# Patient Record
Sex: MALE | HISPANIC OR LATINO | Employment: UNEMPLOYED | URBAN - METROPOLITAN AREA
[De-identification: names, ages, dates, MRNs, and addresses within clinical notes are randomized per-mention and may not be internally consistent; named-entity substitution may affect disease eponyms.]

---

## 2018-12-30 ENCOUNTER — HOSPITAL ENCOUNTER (EMERGENCY)
Facility: MEDICAL CENTER | Age: 38
End: 2018-12-30
Attending: EMERGENCY MEDICINE

## 2018-12-30 ENCOUNTER — APPOINTMENT (OUTPATIENT)
Dept: RADIOLOGY | Facility: MEDICAL CENTER | Age: 38
End: 2018-12-30
Attending: EMERGENCY MEDICINE

## 2018-12-30 VITALS
HEIGHT: 67 IN | SYSTOLIC BLOOD PRESSURE: 141 MMHG | HEART RATE: 82 BPM | RESPIRATION RATE: 15 BRPM | TEMPERATURE: 98.4 F | WEIGHT: 199.96 LBS | DIASTOLIC BLOOD PRESSURE: 96 MMHG | BODY MASS INDEX: 31.38 KG/M2 | OXYGEN SATURATION: 95 %

## 2018-12-30 DIAGNOSIS — R10.9 FLANK PAIN: ICD-10-CM

## 2018-12-30 DIAGNOSIS — R73.9 HYPERGLYCEMIA: ICD-10-CM

## 2018-12-30 DIAGNOSIS — E87.6 HYPOKALEMIA: ICD-10-CM

## 2018-12-30 LAB
ALBUMIN SERPL BCP-MCNC: 4.1 G/DL (ref 3.2–4.9)
ALBUMIN/GLOB SERPL: 1.4 G/DL
ALP SERPL-CCNC: 84 U/L (ref 30–99)
ALT SERPL-CCNC: 23 U/L (ref 2–50)
ANION GAP SERPL CALC-SCNC: 8 MMOL/L (ref 0–11.9)
APPEARANCE UR: CLEAR
AST SERPL-CCNC: 20 U/L (ref 12–45)
BACTERIA #/AREA URNS HPF: ABNORMAL /HPF
BASOPHILS # BLD AUTO: 0.9 % (ref 0–1.8)
BASOPHILS # BLD: 0.05 K/UL (ref 0–0.12)
BILIRUB SERPL-MCNC: 0.9 MG/DL (ref 0.1–1.5)
BILIRUB UR QL STRIP.AUTO: NEGATIVE
BUN SERPL-MCNC: 14 MG/DL (ref 8–22)
CALCIUM SERPL-MCNC: 9.3 MG/DL (ref 8.4–10.2)
CHLORIDE SERPL-SCNC: 101 MMOL/L (ref 96–112)
CO2 SERPL-SCNC: 27 MMOL/L (ref 20–33)
COLOR UR: YELLOW
CREAT SERPL-MCNC: 0.61 MG/DL (ref 0.5–1.4)
EOSINOPHIL # BLD AUTO: 0.08 K/UL (ref 0–0.51)
EOSINOPHIL NFR BLD: 1.5 % (ref 0–6.9)
EPI CELLS #/AREA URNS HPF: NEGATIVE /HPF
ERYTHROCYTE [DISTWIDTH] IN BLOOD BY AUTOMATED COUNT: 35.9 FL (ref 35.9–50)
GLOBULIN SER CALC-MCNC: 2.9 G/DL (ref 1.9–3.5)
GLUCOSE SERPL-MCNC: 339 MG/DL (ref 65–99)
GLUCOSE UR STRIP.AUTO-MCNC: 500 MG/DL
HCT VFR BLD AUTO: 42.6 % (ref 42–52)
HGB BLD-MCNC: 15.5 G/DL (ref 14–18)
IMM GRANULOCYTES # BLD AUTO: 0.02 K/UL (ref 0–0.11)
IMM GRANULOCYTES NFR BLD AUTO: 0.4 % (ref 0–0.9)
KETONES UR STRIP.AUTO-MCNC: NEGATIVE MG/DL
LEUKOCYTE ESTERASE UR QL STRIP.AUTO: NEGATIVE
LYMPHOCYTES # BLD AUTO: 1.42 K/UL (ref 1–4.8)
LYMPHOCYTES NFR BLD: 26.1 % (ref 22–41)
MCH RBC QN AUTO: 30.9 PG (ref 27–33)
MCHC RBC AUTO-ENTMCNC: 36.4 G/DL (ref 33.7–35.3)
MCV RBC AUTO: 85 FL (ref 81.4–97.8)
MICRO URNS: ABNORMAL
MONOCYTES # BLD AUTO: 0.32 K/UL (ref 0–0.85)
MONOCYTES NFR BLD AUTO: 5.9 % (ref 0–13.4)
MUCOUS THREADS #/AREA URNS HPF: ABNORMAL /HPF
NEUTROPHILS # BLD AUTO: 3.55 K/UL (ref 1.82–7.42)
NEUTROPHILS NFR BLD: 65.2 % (ref 44–72)
NITRITE UR QL STRIP.AUTO: NEGATIVE
NRBC # BLD AUTO: 0 K/UL
NRBC BLD-RTO: 0 /100 WBC
PH UR STRIP.AUTO: 6 [PH]
PLATELET # BLD AUTO: 165 K/UL (ref 164–446)
PMV BLD AUTO: 10.7 FL (ref 9–12.9)
POTASSIUM SERPL-SCNC: 3.4 MMOL/L (ref 3.6–5.5)
PROT SERPL-MCNC: 7 G/DL (ref 6–8.2)
PROT UR QL STRIP: NEGATIVE MG/DL
RBC # BLD AUTO: 5.01 M/UL (ref 4.7–6.1)
RBC # URNS HPF: ABNORMAL /HPF
RBC UR QL AUTO: ABNORMAL
SODIUM SERPL-SCNC: 136 MMOL/L (ref 135–145)
SP GR UR STRIP.AUTO: 1.01
WBC # BLD AUTO: 5.4 K/UL (ref 4.8–10.8)
WBC #/AREA URNS HPF: ABNORMAL /HPF

## 2018-12-30 PROCEDURE — 96374 THER/PROPH/DIAG INJ IV PUSH: CPT

## 2018-12-30 PROCEDURE — 81001 URINALYSIS AUTO W/SCOPE: CPT

## 2018-12-30 PROCEDURE — 36415 COLL VENOUS BLD VENIPUNCTURE: CPT

## 2018-12-30 PROCEDURE — A9270 NON-COVERED ITEM OR SERVICE: HCPCS | Performed by: EMERGENCY MEDICINE

## 2018-12-30 PROCEDURE — 80053 COMPREHEN METABOLIC PANEL: CPT

## 2018-12-30 PROCEDURE — 700102 HCHG RX REV CODE 250 W/ 637 OVERRIDE(OP): Performed by: EMERGENCY MEDICINE

## 2018-12-30 PROCEDURE — 99285 EMERGENCY DEPT VISIT HI MDM: CPT

## 2018-12-30 PROCEDURE — 700111 HCHG RX REV CODE 636 W/ 250 OVERRIDE (IP): Performed by: EMERGENCY MEDICINE

## 2018-12-30 PROCEDURE — 85025 COMPLETE CBC W/AUTO DIFF WBC: CPT

## 2018-12-30 PROCEDURE — 74176 CT ABD & PELVIS W/O CONTRAST: CPT

## 2018-12-30 RX ORDER — ACETAMINOPHEN 325 MG/1
650 TABLET ORAL ONCE
Status: COMPLETED | OUTPATIENT
Start: 2018-12-30 | End: 2018-12-30

## 2018-12-30 RX ORDER — KETOROLAC TROMETHAMINE 30 MG/ML
INJECTION, SOLUTION INTRAMUSCULAR; INTRAVENOUS
Status: DISCONTINUED
Start: 2018-12-30 | End: 2018-12-31 | Stop reason: HOSPADM

## 2018-12-30 RX ORDER — KETOROLAC TROMETHAMINE 30 MG/ML
15 INJECTION, SOLUTION INTRAMUSCULAR; INTRAVENOUS ONCE
Status: COMPLETED | OUTPATIENT
Start: 2018-12-30 | End: 2018-12-30

## 2018-12-30 RX ADMIN — KETOROLAC TROMETHAMINE 15 MG: 30 INJECTION, SOLUTION INTRAMUSCULAR at 21:56

## 2018-12-30 RX ADMIN — ACETAMINOPHEN 650 MG: 325 TABLET, FILM COATED ORAL at 21:56

## 2018-12-30 ASSESSMENT — PAIN SCALES - GENERAL
PAINLEVEL_OUTOF10: 0
PAINLEVEL_OUTOF10: 10
PAINLEVEL_OUTOF10: 9

## 2018-12-31 NOTE — ED TRIAGE NOTES
"Cristiano Hamilton 38 y.o. male   Chief Complaint   Patient presents with   • Abdominal Pain     LLQ abdominal pain that radiates to left flank that started a few hours ago; pt reports pain is sharp and stabbing in nature; denies nausea vomiting   • Flank Pain     /96   Pulse (!) 102   Temp 36.4 °C (97.5 °F) (Temporal)   Resp 18   Ht 1.702 m (5' 7\")   Wt 90.7 kg (199 lb 15.3 oz)   SpO2 97%   BMI 31.32 kg/m²     Pt returned to lobby and educated on triage process. Advised to notify RN with changes or concerns.     "

## 2018-12-31 NOTE — ED PROVIDER NOTES
ED Provider Note      Means of Arrival: Private Vehicle  History obtained from: Patient, family      CHIEF COMPLAINT  Chief Complaint   Patient presents with   • Abdominal Pain     LLQ abdominal pain that radiates to left flank that started a few hours ago; pt reports pain is sharp and stabbing in nature; denies nausea vomiting   • Flank Pain       HPI  Cristiano Hamilton is a 38 y.o. male who presents with left-sided flank and groin pain.  The patient reports that he has had minimal pain on the left side for approximately 1 week that comes and goes.  Yesterday it worsened slightly, however 2 hours prior to arrival became severe and sharp.  He reports it occurs approximately every 15 minutes.  He states it is worse with ambulation.  He denies any other aggravating or alleviating factors.  He denies any fevers, chills, nausea, vomiting, diarrhea, hematuria, dysuria.  He has no history of kidney stones.    REVIEW OF SYSTEMS  CONSTITUTIONAL:  No fever.  CARDIOVASCULAR:  No chest discomfort.  RESPIRATORY:  No pleuritic chest pain.  GASTROINTESTINAL:  See HPI  GENITOURINARY:   No dysuria.  MUSCULOSKELETAL:  No arthralgia.  SKIN:  No rash or suspicious lesions.  NEUROLOGIC:   No headache.  ENDOCRINE:  No facial edema.  HEMATOLOGIC:  No abnormal bleeding.       See HPI for further details.   All other systems are negative.     PAST MEDICAL HISTORY  Past Medical History:   Diagnosis Date   • DM (diabetes mellitus) (HCC)    • Other general symptoms(780.99)     hemorrhoids       FAMILY HISTORY  History reviewed. No pertinent family history.    SOCIAL HISTORY   reports that he has been smoking.  He has never used smokeless tobacco. He reports that he drinks alcohol. He reports that he does not use drugs.    SURGICAL HISTORY  History reviewed. No pertinent surgical history.    CURRENT MEDICATIONS  Home Medications     Reviewed by Ofelia Mckeon R.N. (Registered Nurse) on 12/30/18 at 2131  Med List Status: Partial  "  Medication Last Dose Status   metFORMIN (GLUCOPHAGE) 500 MG Tab 12/29/2018 Active                ALLERGIES  Allergies   Allergen Reactions   • Nkda [No Known Drug Allergy]        PHYSICAL EXAM  VITAL SIGNS: /96   Pulse 85   Temp 36.4 °C (97.5 °F) (Temporal)   Resp 18   Ht 1.702 m (5' 7\")   Wt 90.7 kg (199 lb 15.3 oz)   SpO2 96%   BMI 31.32 kg/m²    Gen: Alert, moderate distress  HENT: ATNC  Eyes: Normal conjunctiva  Neck: trachea midline  Resp: no respiratory distress  CV: No JVD, regular rate and rhythm  Abd: non-distended, minimal tenderness left lower quadrant  : No CVA tenderness  Ext: No deformities  Psych: normal mood  Neuro: speech fluent           RADIOLOGY/PROCEDURES  CT-RENAL COLIC EVALUATION(A/P W/O)   Final Result         No evidence of  urinary tract calculus or hydronephrosis.  No evidence of peritoneal inflammation.             LABS  Results for orders placed or performed during the hospital encounter of 12/30/18   CBC WITH DIFFERENTIAL   Result Value Ref Range    WBC 5.4 4.8 - 10.8 K/uL    RBC 5.01 4.70 - 6.10 M/uL    Hemoglobin 15.5 14.0 - 18.0 g/dL    Hematocrit 42.6 42.0 - 52.0 %    MCV 85.0 81.4 - 97.8 fL    MCH 30.9 27.0 - 33.0 pg    MCHC 36.4 (H) 33.7 - 35.3 g/dL    RDW 35.9 35.9 - 50.0 fL    Platelet Count 165 164 - 446 K/uL    MPV 10.7 9.0 - 12.9 fL    Neutrophils-Polys 65.20 44.00 - 72.00 %    Lymphocytes 26.10 22.00 - 41.00 %    Monocytes 5.90 0.00 - 13.40 %    Eosinophils 1.50 0.00 - 6.90 %    Basophils 0.90 0.00 - 1.80 %    Immature Granulocytes 0.40 0.00 - 0.90 %    Nucleated RBC 0.00 /100 WBC    Neutrophils (Absolute) 3.55 1.82 - 7.42 K/uL    Lymphs (Absolute) 1.42 1.00 - 4.80 K/uL    Monos (Absolute) 0.32 0.00 - 0.85 K/uL    Eos (Absolute) 0.08 0.00 - 0.51 K/uL    Baso (Absolute) 0.05 0.00 - 0.12 K/uL    Immature Granulocytes (abs) 0.02 0.00 - 0.11 K/uL    NRBC (Absolute) 0.00 K/uL   COMP METABOLIC PANEL   Result Value Ref Range    Sodium 136 135 - 145 mmol/L    " Potassium 3.4 (L) 3.6 - 5.5 mmol/L    Chloride 101 96 - 112 mmol/L    Co2 27 20 - 33 mmol/L    Anion Gap 8.0 0.0 - 11.9    Glucose 339 (H) 65 - 99 mg/dL    Bun 14 8 - 22 mg/dL    Creatinine 0.61 0.50 - 1.40 mg/dL    Calcium 9.3 8.4 - 10.2 mg/dL    AST(SGOT) 20 12 - 45 U/L    ALT(SGPT) 23 2 - 50 U/L    Alkaline Phosphatase 84 30 - 99 U/L    Total Bilirubin 0.9 0.1 - 1.5 mg/dL    Albumin 4.1 3.2 - 4.9 g/dL    Total Protein 7.0 6.0 - 8.2 g/dL    Globulin 2.9 1.9 - 3.5 g/dL    A-G Ratio 1.4 g/dL   URINALYSIS CULTURE, IF INDICATED   Result Value Ref Range    Color Yellow     Character Clear     Specific Gravity 1.015 <1.035    Ph 6.0 5.0 - 8.0    Glucose 500 (A) Negative mg/dL    Ketones Negative Negative mg/dL    Protein Negative Negative mg/dL    Bilirubin Negative Negative    Nitrite Negative Negative    Leukocyte Esterase Negative Negative    Occult Blood Small (A) Negative    Micro Urine Req Microscopic    URINE MICROSCOPIC (W/UA)   Result Value Ref Range    WBC Rare (A) /hpf    RBC 0-2 (A) /hpf    Bacteria Rare (A) None /hpf    Epithelial Cells Negative Few /hpf    Mucous Threads Rare /hpf   ESTIMATED GFR   Result Value Ref Range    GFR If African American >60 >60 mL/min/1.73 m 2    GFR If Non African American >60 >60 mL/min/1.73 m 2         COURSE & MEDICAL DECISION MAKING  Pertinent Labs & Imaging studies reviewed. (See chart for details)    Patient presents with intermittent left-sided abdominal and flank pain.  This has been migrating downwards over the past few hours.  No history of kidney stones.  Clinically, the patient does not have a surgical abdomen.  Will obtain urinalysis, labs, CT to evaluate for renal colic.    Patient's urinalysis has occult blood.  He has elevated glucose, however no signs of DKA/HHS.  Will treat with ketorolac given high likelihood of renal colic.  Low suspicion for diverticulitis, appendicitis, vascular causes.    10:25 PM  Patient feeling improved after pain medications.  He was  updated on the results of his urine and blood work.  Awaiting results of CT scan.    10:56 PM  CAT scan does not demonstrate any evidence of stone or infection.  Given the patient's symptomatology, I believe he likely passed a small stone.  The patient was advised of his elevated blood sugar and his low potassium.  He was advised to follow-up with his regular doctor for further glucose control.  He was given return precautions.  Diagnostic uncertainty was explained to the patient and his family and was advised to return with any new or worsening symptoms.  Repeat abdominal exams not demonstrate any rebound or guarding    FINAL IMPRESSION  1. Flank pain    2. Hyperglycemia    3. Hypokalemia

## 2018-12-31 NOTE — ED NOTES
D/c inst reviewed w/ the pt to include pain management, fluids, diabetic management, return for worsening sx.  The pt verb understanding and denies questions. Amb out of the ED w/o diff.

## 2018-12-31 NOTE — DISCHARGE INSTRUCTIONS
You were seen in the emergency department for pain on your side.  Your urinalysis showed a small amount of blood.  The CAT scan did not show anything dangerous.  You most likely had a kidney stone that has passed.  Your symptoms should begin to improve over the next day.    For pain you can take ibuprofen (Motrin), 600mg every 6 hours as needed for pain (take with food to avoid GI upset). You can also take acetaminophen (Tylenol), 1000mg every 8 hours as needed for pain. Do not take more than 3000mg of acetaminophen in any 24 hour period.  Taking these medications regularly during the day can be very effective in controlling pain.    You were found to have a low potassium level.  At this time, it does not appear dangerous, however you should eat fruits and vegetables that are high in potassium.  This includes bananas, oranges, avocados, cooked beans, baked potatoes among others.  You can do an Internet search to find foods that are high in potassium.    You were found to have elevated blood sugars in the emergency department.  It is very important that you continue to take your metformin.  Please follow-up with your regular doctor as you may require additional medications to control your diabetes to prevent problems such as kidney failure.    Please follow-up with your regular doctor.    Please return to the emergency department or seek medical attention if you develop:  Worsening pain, fever, vomiting, bloody urine, other concerning findings

## 2019-01-19 ENCOUNTER — HOSPITAL ENCOUNTER (OUTPATIENT)
Dept: LAB | Facility: MEDICAL CENTER | Age: 39
End: 2019-01-19
Attending: FAMILY MEDICINE

## 2019-01-19 LAB
CHOLEST SERPL-MCNC: 174 MG/DL (ref 100–199)
EST. AVERAGE GLUCOSE BLD GHB EST-MCNC: 220 MG/DL
HBA1C MFR BLD: 9.3 % (ref 0–5.6)
HDLC SERPL-MCNC: 26 MG/DL
LDLC SERPL CALC-MCNC: ABNORMAL MG/DL
TRIGL SERPL-MCNC: 876 MG/DL (ref 0–149)

## 2019-01-19 PROCEDURE — 36415 COLL VENOUS BLD VENIPUNCTURE: CPT

## 2019-01-19 PROCEDURE — 83036 HEMOGLOBIN GLYCOSYLATED A1C: CPT

## 2019-01-19 PROCEDURE — 80061 LIPID PANEL: CPT

## 2019-09-07 ENCOUNTER — HOSPITAL ENCOUNTER (OUTPATIENT)
Dept: LAB | Facility: MEDICAL CENTER | Age: 39
End: 2019-09-07
Attending: FAMILY MEDICINE

## 2019-09-07 LAB
ALBUMIN SERPL BCP-MCNC: 4.3 G/DL (ref 3.2–4.9)
ALBUMIN/GLOB SERPL: 1.5 G/DL
ALP SERPL-CCNC: 67 U/L (ref 30–99)
ALT SERPL-CCNC: 21 U/L (ref 2–50)
ANION GAP SERPL CALC-SCNC: 8 MMOL/L (ref 0–11.9)
AST SERPL-CCNC: 18 U/L (ref 12–45)
BILIRUB SERPL-MCNC: 1.2 MG/DL (ref 0.1–1.5)
BUN SERPL-MCNC: 10 MG/DL (ref 8–22)
CALCIUM SERPL-MCNC: 9.1 MG/DL (ref 8.5–10.5)
CHLORIDE SERPL-SCNC: 105 MMOL/L (ref 96–112)
CHOLEST SERPL-MCNC: 155 MG/DL (ref 100–199)
CO2 SERPL-SCNC: 26 MMOL/L (ref 20–33)
CREAT SERPL-MCNC: 0.71 MG/DL (ref 0.5–1.4)
GLOBULIN SER CALC-MCNC: 2.9 G/DL (ref 1.9–3.5)
GLUCOSE SERPL-MCNC: 234 MG/DL (ref 65–99)
HDLC SERPL-MCNC: 32 MG/DL
LDLC SERPL CALC-MCNC: ABNORMAL MG/DL
POTASSIUM SERPL-SCNC: 4.4 MMOL/L (ref 3.6–5.5)
PROT SERPL-MCNC: 7.2 G/DL (ref 6–8.2)
SODIUM SERPL-SCNC: 139 MMOL/L (ref 135–145)
TRIGL SERPL-MCNC: 401 MG/DL (ref 0–149)

## 2019-09-07 PROCEDURE — 36415 COLL VENOUS BLD VENIPUNCTURE: CPT

## 2019-09-07 PROCEDURE — 83036 HEMOGLOBIN GLYCOSYLATED A1C: CPT

## 2019-09-07 PROCEDURE — 80053 COMPREHEN METABOLIC PANEL: CPT

## 2019-09-07 PROCEDURE — 80061 LIPID PANEL: CPT

## 2019-09-08 LAB
EST. AVERAGE GLUCOSE BLD GHB EST-MCNC: 212 MG/DL
HBA1C MFR BLD: 9 % (ref 0–5.6)

## 2021-07-19 ENCOUNTER — HOSPITAL ENCOUNTER (EMERGENCY)
Facility: MEDICAL CENTER | Age: 41
End: 2021-07-19

## 2021-07-19 VITALS
OXYGEN SATURATION: 100 % | RESPIRATION RATE: 15 BRPM | BODY MASS INDEX: 27.77 KG/M2 | WEIGHT: 194 LBS | HEART RATE: 82 BPM | TEMPERATURE: 96.9 F | DIASTOLIC BLOOD PRESSURE: 85 MMHG | SYSTOLIC BLOOD PRESSURE: 150 MMHG | HEIGHT: 70 IN

## 2021-07-19 DIAGNOSIS — M71.162 SEPTIC PREPATELLAR BURSITIS OF LEFT KNEE: ICD-10-CM

## 2021-07-19 PROCEDURE — 99283 EMERGENCY DEPT VISIT LOW MDM: CPT

## 2021-07-19 RX ORDER — IBUPROFEN 600 MG/1
600 TABLET ORAL EVERY 6 HOURS
Qty: 24 TABLET | Refills: 0 | Status: SHIPPED | OUTPATIENT
Start: 2021-07-19 | End: 2021-07-20

## 2021-07-19 RX ORDER — AMOXICILLIN 875 MG/1
875 TABLET, COATED ORAL 2 TIMES DAILY
Qty: 20 TABLET | Refills: 0 | Status: SHIPPED | OUTPATIENT
Start: 2021-07-19 | End: 2021-07-20

## 2021-07-19 RX ORDER — SULFAMETHOXAZOLE AND TRIMETHOPRIM 800; 160 MG/1; MG/1
1 TABLET ORAL 2 TIMES DAILY
Qty: 20 TABLET | Refills: 0 | Status: SHIPPED | OUTPATIENT
Start: 2021-07-19 | End: 2021-07-20

## 2021-07-19 NOTE — ED PROVIDER NOTES
ED Provider Note    CHIEF COMPLAINT  No chief complaint on file.      HPI  Cristiano Soto is a 40 y.o. male who presents with cc of knee pain.  Patient reports that he had a small pimple on his left knee which he popped, he reports that 1 day later he noticed some increased swelling at that area and today he reports that his become more painful and red.  Patient remains ambulatory.  Patient denies any associated fevers or chills.  Patient denies any ankle pain.  He denies any weakness or numbness.  Patient denies any history of IV drug use.    REVIEW OF SYSTEMS  ROS  See HPI for further details. All other systems are negative.     PAST MEDICAL HISTORY   has a past medical history of DM (diabetes mellitus) (HCC) and Other general symptoms(780.99).    SOCIAL HISTORY  Social History     Tobacco Use   • Smoking status: Current Some Day Smoker   • Smokeless tobacco: Never Used   Substance and Sexual Activity   • Alcohol use: Yes   • Drug use: No   • Sexual activity: Not on file       SURGICAL HISTORY  patient denies any surgical history    CURRENT MEDICATIONS  Home Medications    **Home medications have not yet been reviewed for this encounter**         ALLERGIES  Allergies   Allergen Reactions   • Nkda [No Known Drug Allergy]        PHYSICAL EXAM  Vitals:    07/19/21 0757   BP: (!) 162/97   Pulse: 88   Resp: 19   Temp: 36.1 °C (96.9 °F)   SpO2: 98%       Physical Exam  Constitutional:       Appearance: He is well-developed.   Eyes:      Conjunctiva/sclera: Conjunctivae normal.   Pulmonary:      Effort: Pulmonary effort is normal.   Musculoskeletal:      Cervical back: Normal range of motion and neck supple.      Comments: Erythema overlying the prepatelar bursa with mild TTP, no actively draining wound.  Ultrasound reveals cobblestoning of the skin overlying the patella and an increased prominence of the prepatellar bursa.  There is no overt joint effusion on ultrasound or exam.  Full range of motion of  knee with mild pain evoked.  Calf is nontender.  Distal pulses 2+   Skin:     General: Skin is warm.   Neurological:      Mental Status: He is alert and oriented to person, place, and time.   Psychiatric:         Behavior: Behavior normal.           COURSE & MEDICAL DECISION MAKING  Pertinent Labs & Imaging studies reviewed. (See chart for details)    Very well-appearing afebrile patient here with symptoms most consistent with prepatellar septic bursitis, this is likely caused from an overlying very small cutaneous infection, I believe systemic infection is therefore highly unlikely given patient's both symptoms and the mechanism of infection.  Patient with forage motion of his knee, he does not have any obvious effusion of the joint space on both ultrasound and exam.  Patient at this point does not have evidence of septic arthritis.  Patient will be given Bactrim and amoxicillin.  I have discussed the case with Dr. Luu of orthopedics who agrees with current management.  Patient understands that he must return here or his primary care to office in 2 days for symptom recheck to ensure symptoms are improving, he understands to return immediately if symptoms worsen.     The patient will return for worsening symptoms and is stable at the time of discharge. The patient verbalizes understanding and will comply.    FINAL IMPRESSION    1. Septic prepatellar bursitis of left knee               Electronically signed by: Sanju Noel M.D., 7/19/2021 8:01 AM

## 2021-07-19 NOTE — ED TRIAGE NOTES
"Chief Complaint   Patient presents with   • Knee Pain     Left, started Sunday after popping \"a small pimple,\" states is now having increased pain and swelling in Left Knee     BP (!) 162/97   Pulse 88   Temp 36.1 °C (96.9 °F) (Temporal)   Resp 19   Ht 1.78 m (5' 10.08\")   Wt 88 kg (194 lb 0.1 oz)   SpO2 98%   BMI 27.77 kg/m²     Pt ambulated to ED w/ family w/ c/o Left Knee pain, s/p \"popping a small pimple on Sunday.\"  Pt states is now having difficulty bearing full weight on LLE and is concerned r/t increasing swelling.      No Tdap listed in Immunization History.     Services used to complete Triage.    "

## 2021-07-19 NOTE — DISCHARGE INSTRUCTIONS
You likely have an infection around the sac surrounding your knee, I would like you to follow-up in 2 days by either coming here for a recheck or Dr. Curry.  If you develop a fever, worsening pain, worsening redness or swelling then return to the emergency department.

## 2021-07-19 NOTE — ED TRIAGE NOTES
Reviewed discharge instructions and prescriptions x 3 sent to selected Pharmacy w/ pt and family using  Services, verbalized understanding to information provided including follow up care in two days, return precautions and medications, denied further questions/concerns.  Pt ambulated from ED w/ family.

## 2021-07-20 ENCOUNTER — APPOINTMENT (OUTPATIENT)
Dept: RADIOLOGY | Facility: MEDICAL CENTER | Age: 41
DRG: 580 | End: 2021-07-20
Attending: EMERGENCY MEDICINE

## 2021-07-20 ENCOUNTER — HOSPITAL ENCOUNTER (INPATIENT)
Facility: MEDICAL CENTER | Age: 41
LOS: 6 days | DRG: 580 | End: 2021-07-26
Attending: EMERGENCY MEDICINE | Admitting: HOSPITALIST

## 2021-07-20 DIAGNOSIS — L02.419: ICD-10-CM

## 2021-07-20 DIAGNOSIS — L03.116 CELLULITIS OF LEFT LOWER EXTREMITY: ICD-10-CM

## 2021-07-20 PROBLEM — M25.562 ACUTE PAIN OF LEFT KNEE: Status: ACTIVE | Noted: 2021-07-20

## 2021-07-20 PROBLEM — E11.9 TYPE 2 DIABETES MELLITUS, WITHOUT LONG-TERM CURRENT USE OF INSULIN (HCC): Status: ACTIVE | Noted: 2021-07-20

## 2021-07-20 LAB
ALBUMIN SERPL BCP-MCNC: 3.7 G/DL (ref 3.2–4.9)
ALBUMIN/GLOB SERPL: 1.1 G/DL
ALP SERPL-CCNC: 98 U/L (ref 30–99)
ALT SERPL-CCNC: 14 U/L (ref 2–50)
ANION GAP SERPL CALC-SCNC: 12 MMOL/L (ref 7–16)
AST SERPL-CCNC: 13 U/L (ref 12–45)
BASOPHILS # BLD AUTO: 0.5 % (ref 0–1.8)
BASOPHILS # BLD: 0.04 K/UL (ref 0–0.12)
BILIRUB SERPL-MCNC: 0.5 MG/DL (ref 0.1–1.5)
BUN SERPL-MCNC: 16 MG/DL (ref 8–22)
CALCIUM SERPL-MCNC: 8.9 MG/DL (ref 8.4–10.2)
CHLORIDE SERPL-SCNC: 100 MMOL/L (ref 96–112)
CO2 SERPL-SCNC: 22 MMOL/L (ref 20–33)
CREAT SERPL-MCNC: 0.85 MG/DL (ref 0.5–1.4)
EOSINOPHIL # BLD AUTO: 0.07 K/UL (ref 0–0.51)
EOSINOPHIL NFR BLD: 0.9 % (ref 0–6.9)
ERYTHROCYTE [DISTWIDTH] IN BLOOD BY AUTOMATED COUNT: 38.8 FL (ref 35.9–50)
GLOBULIN SER CALC-MCNC: 3.4 G/DL (ref 1.9–3.5)
GLUCOSE BLD-MCNC: 145 MG/DL (ref 65–99)
GLUCOSE BLD-MCNC: 255 MG/DL (ref 65–99)
GLUCOSE SERPL-MCNC: 201 MG/DL (ref 65–99)
HCT VFR BLD AUTO: 37.7 % (ref 42–52)
HGB BLD-MCNC: 13.6 G/DL (ref 14–18)
IMM GRANULOCYTES # BLD AUTO: 0.04 K/UL (ref 0–0.11)
IMM GRANULOCYTES NFR BLD AUTO: 0.5 % (ref 0–0.9)
LACTATE BLD-SCNC: 1.4 MMOL/L (ref 0.5–2)
LACTATE BLD-SCNC: 1.5 MMOL/L (ref 0.5–2)
LACTATE BLD-SCNC: 1.8 MMOL/L (ref 0.5–2)
LYMPHOCYTES # BLD AUTO: 0.91 K/UL (ref 1–4.8)
LYMPHOCYTES NFR BLD: 11.4 % (ref 22–41)
MCH RBC QN AUTO: 31.5 PG (ref 27–33)
MCHC RBC AUTO-ENTMCNC: 36.1 G/DL (ref 33.7–35.3)
MCV RBC AUTO: 87.3 FL (ref 81.4–97.8)
MONOCYTES # BLD AUTO: 0.43 K/UL (ref 0–0.85)
MONOCYTES NFR BLD AUTO: 5.4 % (ref 0–13.4)
NEUTROPHILS # BLD AUTO: 6.5 K/UL (ref 1.82–7.42)
NEUTROPHILS NFR BLD: 81.3 % (ref 44–72)
NRBC # BLD AUTO: 0 K/UL
NRBC BLD-RTO: 0 /100 WBC
PLATELET # BLD AUTO: 153 K/UL (ref 164–446)
PMV BLD AUTO: 11.2 FL (ref 9–12.9)
POTASSIUM SERPL-SCNC: 3.9 MMOL/L (ref 3.6–5.5)
PROT SERPL-MCNC: 7.1 G/DL (ref 6–8.2)
RBC # BLD AUTO: 4.32 M/UL (ref 4.7–6.1)
SODIUM SERPL-SCNC: 134 MMOL/L (ref 135–145)
WBC # BLD AUTO: 8 K/UL (ref 4.8–10.8)

## 2021-07-20 PROCEDURE — 99222 1ST HOSP IP/OBS MODERATE 55: CPT | Performed by: HOSPITALIST

## 2021-07-20 PROCEDURE — 96374 THER/PROPH/DIAG INJ IV PUSH: CPT

## 2021-07-20 PROCEDURE — 700105 HCHG RX REV CODE 258: Performed by: EMERGENCY MEDICINE

## 2021-07-20 PROCEDURE — 700111 HCHG RX REV CODE 636 W/ 250 OVERRIDE (IP): Performed by: EMERGENCY MEDICINE

## 2021-07-20 PROCEDURE — A9270 NON-COVERED ITEM OR SERVICE: HCPCS | Performed by: HOSPITALIST

## 2021-07-20 PROCEDURE — 83605 ASSAY OF LACTIC ACID: CPT | Mod: 91

## 2021-07-20 PROCEDURE — 99285 EMERGENCY DEPT VISIT HI MDM: CPT

## 2021-07-20 PROCEDURE — 82962 GLUCOSE BLOOD TEST: CPT | Mod: 91

## 2021-07-20 PROCEDURE — 85025 COMPLETE CBC W/AUTO DIFF WBC: CPT

## 2021-07-20 PROCEDURE — 73564 X-RAY EXAM KNEE 4 OR MORE: CPT | Mod: LT

## 2021-07-20 PROCEDURE — 770006 HCHG ROOM/CARE - MED/SURG/GYN SEMI*

## 2021-07-20 PROCEDURE — 700105 HCHG RX REV CODE 258: Performed by: HOSPITALIST

## 2021-07-20 PROCEDURE — 700111 HCHG RX REV CODE 636 W/ 250 OVERRIDE (IP): Performed by: HOSPITALIST

## 2021-07-20 PROCEDURE — 700102 HCHG RX REV CODE 250 W/ 637 OVERRIDE(OP): Performed by: HOSPITALIST

## 2021-07-20 PROCEDURE — 94760 N-INVAS EAR/PLS OXIMETRY 1: CPT

## 2021-07-20 PROCEDURE — 36415 COLL VENOUS BLD VENIPUNCTURE: CPT

## 2021-07-20 PROCEDURE — 80053 COMPREHEN METABOLIC PANEL: CPT

## 2021-07-20 PROCEDURE — 87040 BLOOD CULTURE FOR BACTERIA: CPT

## 2021-07-20 PROCEDURE — 83036 HEMOGLOBIN GLYCOSYLATED A1C: CPT

## 2021-07-20 RX ORDER — AMOXICILLIN 875 MG/1
875 TABLET, COATED ORAL 2 TIMES DAILY
Status: ON HOLD | COMMUNITY
End: 2021-07-26

## 2021-07-20 RX ORDER — OXYCODONE HYDROCHLORIDE 5 MG/1
5 TABLET ORAL
Status: DISCONTINUED | OUTPATIENT
Start: 2021-07-20 | End: 2021-07-24

## 2021-07-20 RX ORDER — ONDANSETRON 4 MG/1
4 TABLET, ORALLY DISINTEGRATING ORAL EVERY 4 HOURS PRN
Status: DISCONTINUED | OUTPATIENT
Start: 2021-07-20 | End: 2021-07-26 | Stop reason: HOSPADM

## 2021-07-20 RX ORDER — ACETAMINOPHEN 325 MG/1
650 TABLET ORAL EVERY 4 HOURS PRN
COMMUNITY

## 2021-07-20 RX ORDER — CLONIDINE HYDROCHLORIDE 0.1 MG/1
0.1 TABLET ORAL EVERY 6 HOURS PRN
Status: DISCONTINUED | OUTPATIENT
Start: 2021-07-20 | End: 2021-07-26 | Stop reason: HOSPADM

## 2021-07-20 RX ORDER — SULFAMETHOXAZOLE AND TRIMETHOPRIM 800; 160 MG/1; MG/1
1 TABLET ORAL 2 TIMES DAILY
Status: ON HOLD | COMMUNITY
End: 2021-07-26

## 2021-07-20 RX ORDER — POLYETHYLENE GLYCOL 3350 17 G/17G
1 POWDER, FOR SOLUTION ORAL
Status: DISCONTINUED | OUTPATIENT
Start: 2021-07-20 | End: 2021-07-26 | Stop reason: HOSPADM

## 2021-07-20 RX ORDER — ONDANSETRON 2 MG/ML
4 INJECTION INTRAMUSCULAR; INTRAVENOUS EVERY 4 HOURS PRN
Status: DISCONTINUED | OUTPATIENT
Start: 2021-07-20 | End: 2021-07-26 | Stop reason: HOSPADM

## 2021-07-20 RX ORDER — HYDROMORPHONE HYDROCHLORIDE 1 MG/ML
0.25 INJECTION, SOLUTION INTRAMUSCULAR; INTRAVENOUS; SUBCUTANEOUS
Status: DISCONTINUED | OUTPATIENT
Start: 2021-07-20 | End: 2021-07-24

## 2021-07-20 RX ORDER — AMOXICILLIN 250 MG
2 CAPSULE ORAL 2 TIMES DAILY
Status: DISCONTINUED | OUTPATIENT
Start: 2021-07-20 | End: 2021-07-26 | Stop reason: HOSPADM

## 2021-07-20 RX ORDER — ACETAMINOPHEN 325 MG/1
650 TABLET ORAL EVERY 6 HOURS PRN
Status: DISCONTINUED | OUTPATIENT
Start: 2021-07-20 | End: 2021-07-26 | Stop reason: HOSPADM

## 2021-07-20 RX ORDER — IBUPROFEN 600 MG/1
600 TABLET ORAL EVERY 6 HOURS
COMMUNITY

## 2021-07-20 RX ORDER — PROMETHAZINE HYDROCHLORIDE 25 MG/1
12.5-25 SUPPOSITORY RECTAL EVERY 4 HOURS PRN
Status: DISCONTINUED | OUTPATIENT
Start: 2021-07-20 | End: 2021-07-26 | Stop reason: HOSPADM

## 2021-07-20 RX ORDER — BISACODYL 10 MG
10 SUPPOSITORY, RECTAL RECTAL
Status: DISCONTINUED | OUTPATIENT
Start: 2021-07-20 | End: 2021-07-26 | Stop reason: HOSPADM

## 2021-07-20 RX ORDER — PROCHLORPERAZINE EDISYLATE 5 MG/ML
5-10 INJECTION INTRAMUSCULAR; INTRAVENOUS EVERY 4 HOURS PRN
Status: DISCONTINUED | OUTPATIENT
Start: 2021-07-20 | End: 2021-07-26 | Stop reason: HOSPADM

## 2021-07-20 RX ORDER — PROMETHAZINE HYDROCHLORIDE 25 MG/1
12.5-25 TABLET ORAL EVERY 4 HOURS PRN
Status: DISCONTINUED | OUTPATIENT
Start: 2021-07-20 | End: 2021-07-26 | Stop reason: HOSPADM

## 2021-07-20 RX ORDER — ENALAPRILAT 1.25 MG/ML
1.25 INJECTION INTRAVENOUS EVERY 6 HOURS PRN
Status: DISCONTINUED | OUTPATIENT
Start: 2021-07-20 | End: 2021-07-26 | Stop reason: HOSPADM

## 2021-07-20 RX ORDER — OXYCODONE HYDROCHLORIDE 5 MG/1
2.5 TABLET ORAL
Status: DISCONTINUED | OUTPATIENT
Start: 2021-07-20 | End: 2021-07-24

## 2021-07-20 RX ORDER — LABETALOL HYDROCHLORIDE 5 MG/ML
10 INJECTION, SOLUTION INTRAVENOUS EVERY 4 HOURS PRN
Status: DISCONTINUED | OUTPATIENT
Start: 2021-07-20 | End: 2021-07-26 | Stop reason: HOSPADM

## 2021-07-20 RX ORDER — DEXTROSE MONOHYDRATE 25 G/50ML
50 INJECTION, SOLUTION INTRAVENOUS
Status: DISCONTINUED | OUTPATIENT
Start: 2021-07-20 | End: 2021-07-26 | Stop reason: HOSPADM

## 2021-07-20 RX ADMIN — HYDROMORPHONE HYDROCHLORIDE 0.25 MG: 1 INJECTION, SOLUTION INTRAMUSCULAR; INTRAVENOUS; SUBCUTANEOUS at 21:47

## 2021-07-20 RX ADMIN — OXYCODONE HYDROCHLORIDE 5 MG: 5 TABLET ORAL at 16:51

## 2021-07-20 RX ADMIN — OXYCODONE HYDROCHLORIDE 5 MG: 5 TABLET ORAL at 20:18

## 2021-07-20 RX ADMIN — SODIUM CHLORIDE 3 G: 900 INJECTION INTRAVENOUS at 14:40

## 2021-07-20 RX ADMIN — INSULIN HUMAN 5 UNITS: 100 INJECTION, SOLUTION PARENTERAL at 20:31

## 2021-07-20 RX ADMIN — SODIUM CHLORIDE 3 G: 900 INJECTION INTRAVENOUS at 20:18

## 2021-07-20 ASSESSMENT — COGNITIVE AND FUNCTIONAL STATUS - GENERAL
MOVING TO AND FROM BED TO CHAIR: A LITTLE
CLIMB 3 TO 5 STEPS WITH RAILING: A LITTLE
SUGGESTED CMS G CODE MODIFIER MOBILITY: CK
WALKING IN HOSPITAL ROOM: A LITTLE
STANDING UP FROM CHAIR USING ARMS: A LITTLE
DAILY ACTIVITIY SCORE: 22
MOBILITY SCORE: 19
DRESSING REGULAR LOWER BODY CLOTHING: A LITTLE
HELP NEEDED FOR BATHING: A LITTLE
MOVING FROM LYING ON BACK TO SITTING ON SIDE OF FLAT BED: A LITTLE
SUGGESTED CMS G CODE MODIFIER DAILY ACTIVITY: CJ

## 2021-07-20 ASSESSMENT — LIFESTYLE VARIABLES
TOTAL SCORE: 0
HAVE PEOPLE ANNOYED YOU BY CRITICIZING YOUR DRINKING: NO
TOTAL SCORE: 0
EVER HAD A DRINK FIRST THING IN THE MORNING TO STEADY YOUR NERVES TO GET RID OF A HANGOVER: NO
ON A TYPICAL DAY WHEN YOU DRINK ALCOHOL HOW MANY DRINKS DO YOU HAVE: 1
TOTAL SCORE: 0
AVERAGE NUMBER OF DAYS PER WEEK YOU HAVE A DRINK CONTAINING ALCOHOL: 1
HOW MANY TIMES IN THE PAST YEAR HAVE YOU HAD 5 OR MORE DRINKS IN A DAY: 0
EVER FELT BAD OR GUILTY ABOUT YOUR DRINKING: NO
CONSUMPTION TOTAL: NEGATIVE
ALCOHOL_USE: YES
HAVE YOU EVER FELT YOU SHOULD CUT DOWN ON YOUR DRINKING: NO

## 2021-07-20 ASSESSMENT — PATIENT HEALTH QUESTIONNAIRE - PHQ9
SUM OF ALL RESPONSES TO PHQ9 QUESTIONS 1 AND 2: 0
1. LITTLE INTEREST OR PLEASURE IN DOING THINGS: NOT AT ALL
2. FEELING DOWN, DEPRESSED, IRRITABLE, OR HOPELESS: NOT AT ALL
1. LITTLE INTEREST OR PLEASURE IN DOING THINGS: NOT AT ALL
2. FEELING DOWN, DEPRESSED, IRRITABLE, OR HOPELESS: NOT AT ALL
SUM OF ALL RESPONSES TO PHQ9 QUESTIONS 1 AND 2: 0

## 2021-07-20 ASSESSMENT — PAIN DESCRIPTION - PAIN TYPE
TYPE: ACUTE PAIN

## 2021-07-20 NOTE — ED PROVIDER NOTES
ED Provider Note    CHIEF COMPLAINT   Chief Complaint   Patient presents with   • Knee Pain       HPI   Cristiano Soto is a 40 y.o. male who presents for evaluation of left knee infection.  It started 3 days ago, popping a pimple on the skin anterior to his left knee.  Ultrasound yesterday revealed evidence of septic bursitis.  Patient has left anterior knee pain, no overt fever.  The area of redness is approximately tripled.  Patient is diabetic.  No fever or chills.  No dizziness.  No chest pain, no vomiting or diarrhea.    REVIEW OF SYSTEMS   Musculoskeletal: Left knee pain  Neurologic: No numbness  Skin: Left knee redness and swelling  Constitutional: No fever  Endocrine: diabetes      PAST MEDICAL HISTORY   Past Medical History:   Diagnosis Date   • DM (diabetes mellitus) (HCC)    • Other general symptoms(780.99)     hemorrhoids       FAMILY HISTORY  History reviewed. No pertinent family history.    SOCIAL HISTORY  Social History     Socioeconomic History   • Marital status:      Spouse name: Not on file   • Number of children: Not on file   • Years of education: Not on file   • Highest education level: Not on file   Occupational History   • Not on file   Tobacco Use   • Smoking status: Former Smoker   • Smokeless tobacco: Never Used   Substance and Sexual Activity   • Alcohol use: Not Currently     Comment: occasionally   • Drug use: No   • Sexual activity: Not on file   Other Topics Concern   • Not on file   Social History Narrative   • Not on file     Social Determinants of Health     Financial Resource Strain:    • Difficulty of Paying Living Expenses:    Food Insecurity:    • Worried About Running Out of Food in the Last Year:    • Ran Out of Food in the Last Year:    Transportation Needs:    • Lack of Transportation (Medical):    • Lack of Transportation (Non-Medical):    Physical Activity:    • Days of Exercise per Week:    • Minutes of Exercise per Session:    Stress:    • Feeling of  "Stress :    Social Connections:    • Frequency of Communication with Friends and Family:    • Frequency of Social Gatherings with Friends and Family:    • Attends Jewish Services:    • Active Member of Clubs or Organizations:    • Attends Club or Organization Meetings:    • Marital Status:    Intimate Partner Violence:    • Fear of Current or Ex-Partner:    • Emotionally Abused:    • Physically Abused:    • Sexually Abused:        SURGICAL HISTORY  History reviewed. No pertinent surgical history.    CURRENT MEDICATIONS   Home Medications    **Home medications have not yet been reviewed for this encounter**         ALLERGIES   Allergies   Allergen Reactions   • Nkda [No Known Drug Allergy]        PHYSICAL EXAM  VITAL SIGNS: /84   Pulse (!) 108   Temp 36.5 °C (97.7 °F) (Temporal)   Resp 16   Ht 1.68 m (5' 6.14\")   Wt 89.3 kg (196 lb 13.9 oz)   SpO2 99%   BMI 31.64 kg/m²   Skin: Erythematous change, warmth and induration anterior left knee.  No proximal lymphangitis..   Vascular: Intact distal capillary refill.  Normal pulse left foot  Musculoskeletal: Axial loading of pressure in the knee joint does not elicit pain.  There is tenderness to the anterior surface of the knee.  Posterior knee nontender.  proximal thigh and left calf also nontender.  Neurologic: Sensation intact left foot    RADIOLOGY/PROCEDURES  DX-KNEE COMPLETE 4+ LEFT   Final Result      1.  No evidence of fracture or bony destructive change.      2.  Soft tissue swelling.        Results for orders placed or performed during the hospital encounter of 07/20/21   CBC WITH DIFFERENTIAL   Result Value Ref Range    WBC 8.0 4.8 - 10.8 K/uL    RBC 4.32 (L) 4.70 - 6.10 M/uL    Hemoglobin 13.6 (L) 14.0 - 18.0 g/dL    Hematocrit 37.7 (L) 42.0 - 52.0 %    MCV 87.3 81.4 - 97.8 fL    MCH 31.5 27.0 - 33.0 pg    MCHC 36.1 (H) 33.7 - 35.3 g/dL    RDW 38.8 35.9 - 50.0 fL    Platelet Count 153 (L) 164 - 446 K/uL    MPV 11.2 9.0 - 12.9 fL    " Neutrophils-Polys 81.30 (H) 44.00 - 72.00 %    Lymphocytes 11.40 (L) 22.00 - 41.00 %    Monocytes 5.40 0.00 - 13.40 %    Eosinophils 0.90 0.00 - 6.90 %    Basophils 0.50 0.00 - 1.80 %    Immature Granulocytes 0.50 0.00 - 0.90 %    Nucleated RBC 0.00 /100 WBC    Neutrophils (Absolute) 6.50 1.82 - 7.42 K/uL    Lymphs (Absolute) 0.91 (L) 1.00 - 4.80 K/uL    Monos (Absolute) 0.43 0.00 - 0.85 K/uL    Eos (Absolute) 0.07 0.00 - 0.51 K/uL    Baso (Absolute) 0.04 0.00 - 0.12 K/uL    Immature Granulocytes (abs) 0.04 0.00 - 0.11 K/uL    NRBC (Absolute) 0.00 K/uL   LACTIC ACID   Result Value Ref Range    Lactic Acid 1.4 0.5 - 2.0 mmol/L         COURSE & MEDICAL DECISION MAKING  Pertinent Labs & Imaging studies reviewed. (See chart for details)  Patient returns after starting Keflex and Bactrim yesterday with rapid widening of the erythematous region.  Unlikely septic arthritis, no pain to the joint with axial loading of pressure into it, no effusion on x-ray, no evidence of osteomyelitis or bony destruction.  Secondary to evidence worsening infection, history of diabetes, tachycardia, septic work-up obtained thus far negative, normal white blood cell count, normal lactic acid.  Patient received IV Unasyn for treatment of possible sepsis secondary to cellulitis.  He is hospitalized for IV antibiotics after failure of outpatient therapy.    FINAL IMPRESSION     1. Cellulitis of left lower extremity               Electronically signed by: Zhang Miner M.D., 7/20/2021 2:07 PM

## 2021-07-20 NOTE — ASSESSMENT & PLAN NOTE
- A1c 9.2, pt states he takes Metformin only at home  - Discussed his DM with him - has been on Metformin + Glipizide in the past but developed hypoglycemia. Has diarrhea with 1000mg BID Metformin, but is amenable to trying 500mg at night along with his 1000mg qam. Continue this regimen along with insulin while in hospital   - Increased to 10u Lantus at night with satisfactory effect

## 2021-07-20 NOTE — ED TRIAGE NOTES
Chief Complaint   Patient presents with   • Knee Pain      pt complains of left knee pain. Swelling and redness noted. Pt states he is being treated with ABX  (amoxicillin and bactrim) for infection on same knee, returns with  worsening symtoms.

## 2021-07-20 NOTE — ASSESSMENT & PLAN NOTE
-  Continue Zyvox pending culture data from I&D on 7/23  - Home when pain controlled and cultures result  - Appreciate Dr Lara

## 2021-07-20 NOTE — ED NOTES
Assumed patient care. Pt assesement done.  Plan of care reviewed with patient.  Second BC drawn. Antibiotic started.

## 2021-07-20 NOTE — ED NOTES
Med rec updated and complete  Allergies reviewed  Interviewed pt with sister at bedside with permission from pt.  Pt reports no vitamins       No current facility-administered medications on file prior to encounter.     Current Outpatient Medications on File Prior to Encounter   Medication Sig Dispense Refill   • amoxicillin (AMOXIL) 875 MG tablet Take 875 mg by mouth 2 times a day. Pt started on 7/19/2021 for 10 day course     • sulfamethoxazole-trimethoprim (BACTRIM DS) 800-160 MG tablet Take 1 tablet by mouth 2 times a day. Pt started on 7/19/2021 for 10 day course     • ibuprofen (MOTRIN) 600 MG Tab Take 600 mg by mouth every 6 hours. Pt started on 7/19/2021     • acetaminophen (TYLENOL) 325 MG Tab Take 650 mg by mouth every four hours as needed for Moderate Pain.     • metformin (GLUCOPHAGE) 1000 MG tablet Take 1,000 mg by mouth every day.

## 2021-07-20 NOTE — H&P
[facility] Medicine History & Physical Note    Date of Service  7/20/2021    PCP: Mohsen Tamasaby, M.D.    CC: Worsening knee pain and swelling    HPI:   Cristiano Soto is a 40 y.o. male who presents for evaluation of left knee infection.  It started 3 days ago, popping a pimple on the skin anterior to his left knee.  Ultrasound yesterday in ED revealed evidence of septic bursitis.  Patient has left anterior knee pain, no overt fever.  The area of redness is approximately tripled in has had a more difficult time bending at the left knee.  Denies trauma, recent procedures or aspirations.  No fevers or chills, endorses fatigue.    Patient is diabetic.    Previous A1c 9%, no dizziness.  No chest pain, no vomiting or diarrhea.     I discussed this patient's case with Dr Miner of the emergency department.  I antibiotics were started in the ED, left knee films pending.    Consultants:   None yet    ROS: As above. The remainder of a complete review of systems is negative in all systems except as noted.    PMHx:   has a past medical history of DM (diabetes mellitus) ([facility]) and Other general symptoms(780.99). He also has no past medical history of ASTHMA, CAD (coronary artery disease), Cancer (HCC), Congestive heart failure (HCC), COPD, Diabetes, Hypertension, Infectious disease, Liver disease, Psychiatric disorder, Renal disorder, Seizure disorder (HCC), or Stroke ([facility]).    PSHx:   has no past surgical history on file.    SHx:   reports that he has quit smoking. He has never used smokeless tobacco. He reports previous alcohol use. He reports that he does not use drugs.    FHx:  Reviewed with patient, no pertinent family history noted.    Allergies:  Allergies   Allergen Reactions   • Nkda [No Known Drug Allergy]        Medications:  No current facility-administered medications on file prior to encounter.     Current Outpatient Medications on File Prior to Encounter   Medication Sig Dispense Refill   •  "amoxicillin (AMOXIL) 875 MG tablet Take 875 mg by mouth 2 times a day. Pt started on 7/19/2021 for 10 day course     • sulfamethoxazole-trimethoprim (BACTRIM DS) 800-160 MG tablet Take 1 tablet by mouth 2 times a day. Pt started on 7/19/2021 for 10 day course     • ibuprofen (MOTRIN) 600 MG Tab Take 600 mg by mouth every 6 hours. Pt started on 7/19/2021     • acetaminophen (TYLENOL) 325 MG Tab Take 650 mg by mouth every four hours as needed for Moderate Pain.     • metformin (GLUCOPHAGE) 1000 MG tablet Take 1,000 mg by mouth every day.         Objective Exam:  Vitals:    07/20/21 1323 07/20/21 1410 07/20/21 1510   BP: 157/84 (!) 169/98 (!) 169/91   Pulse: (!) 108 98 99   Resp: 16     Temp: 36.5 °C (97.7 °F)     TempSrc: Temporal     SpO2: 99% 99% 98%   Weight: 89.3 kg (196 lb 13.9 oz)     Height: 1.68 m (5' 6.14\")         Physical Exam  Vitals and nursing note reviewed.   Constitutional:       General: He is not in acute distress.     Appearance: Normal appearance. He is well-developed. He is not toxic-appearing.   HENT:      Head: Normocephalic and atraumatic.      Nose: No congestion.      Mouth/Throat:      Mouth: Mucous membranes are moist.   Eyes:      General: No scleral icterus.     Conjunctiva/sclera: Conjunctivae normal.   Cardiovascular:      Rate and Rhythm: Normal rate.      Pulses: Normal pulses.   Pulmonary:      Effort: Pulmonary effort is normal. No respiratory distress.      Breath sounds: No wheezing.   Abdominal:      General: Bowel sounds are normal. There is no distension.      Palpations: Abdomen is soft.      Tenderness: There is no abdominal tenderness. There is no guarding.   Musculoskeletal:         General: Swelling and tenderness present.      Cervical back: Normal range of motion and neck supple. No rigidity.      Comments: Circumferential knee swelling left side tender palpation   Skin:     General: Skin is warm and dry.      Findings: No erythema.   Neurological:      General: No focal " deficit present.      Mental Status: He is alert and oriented to person, place, and time.      Cranial Nerves: No cranial nerve deficit.      Coordination: Coordination normal.   Psychiatric:         Mood and Affect: Mood normal.         Thought Content: Thought content normal.         Laboratory--reviewed personally and are as follows:  Lab Results   Component Value Date/Time    WBC 8.0 07/20/2021 02:08 PM    RBC 4.32 (L) 07/20/2021 02:08 PM    HEMOGLOBIN 13.6 (L) 07/20/2021 02:08 PM    HEMATOCRIT 37.7 (L) 07/20/2021 02:08 PM    MCV 87.3 07/20/2021 02:08 PM    MCH 31.5 07/20/2021 02:08 PM    MCHC 36.1 (H) 07/20/2021 02:08 PM    MPV 11.2 07/20/2021 02:08 PM    NEUTSPOLYS 81.30 (H) 07/20/2021 02:08 PM    LYMPHOCYTES 11.40 (L) 07/20/2021 02:08 PM    MONOCYTES 5.40 07/20/2021 02:08 PM    EOSINOPHILS 0.90 07/20/2021 02:08 PM    BASOPHILS 0.50 07/20/2021 02:08 PM      Lab Results   Component Value Date/Time    SODIUM 134 (L) 07/20/2021 02:08 PM    POTASSIUM 3.9 07/20/2021 02:08 PM    CHLORIDE 100 07/20/2021 02:08 PM    CO2 22 07/20/2021 02:08 PM    GLUCOSE 201 (H) 07/20/2021 02:08 PM    BUN 16 07/20/2021 02:08 PM    CREATININE 0.85 07/20/2021 02:08 PM    CREATININE 0.8 03/22/2008 06:00 AM      No results found for: PROTHROMBTM, INR     Radiology  Knee films pending    Assessment/Plan:  * Acute pain of left knee  Assessment & Plan  Started with small pimple which patient had popped, sent home from ED on p.o. antibiotics, returns with worsening swelling and pain  Follow-up plain film left knee, ?orthopedics consult pending results  Start IV Unasyn  Follow a.m. labs does not appear septic    Type 2 diabetes mellitus, without long-term current use of insulin (HCC)  Assessment & Plan  Previous A1c greater than 9%  Patient denies taking any diabetic medications at home  Follow-up repeat A1c, start insulin sliding scale     It is expected patient will stay beyond 2 midnights.    VTE prophylaxis: Lovenox

## 2021-07-21 PROBLEM — E87.1 HYPONATREMIA: Status: ACTIVE | Noted: 2021-07-21

## 2021-07-21 PROBLEM — M71.10 SEPTIC BURSITIS: Status: ACTIVE | Noted: 2021-07-20

## 2021-07-21 PROBLEM — E78.1 HYPERTRIGLYCERIDEMIA: Status: ACTIVE | Noted: 2021-07-21

## 2021-07-21 LAB
ALBUMIN SERPL BCP-MCNC: 3.3 G/DL (ref 3.2–4.9)
ALBUMIN/GLOB SERPL: 1 G/DL
ALP SERPL-CCNC: 92 U/L (ref 30–99)
ALT SERPL-CCNC: 11 U/L (ref 2–50)
ANION GAP SERPL CALC-SCNC: 12 MMOL/L (ref 7–16)
AST SERPL-CCNC: 11 U/L (ref 12–45)
BASOPHILS # BLD AUTO: 0.5 % (ref 0–1.8)
BASOPHILS # BLD: 0.04 K/UL (ref 0–0.12)
BILIRUB SERPL-MCNC: 0.5 MG/DL (ref 0.1–1.5)
BUN SERPL-MCNC: 12 MG/DL (ref 8–22)
CALCIUM SERPL-MCNC: 8.4 MG/DL (ref 8.4–10.2)
CHLORIDE SERPL-SCNC: 101 MMOL/L (ref 96–112)
CHOLEST SERPL-MCNC: 151 MG/DL (ref 100–199)
CO2 SERPL-SCNC: 21 MMOL/L (ref 20–33)
CREAT SERPL-MCNC: 0.8 MG/DL (ref 0.5–1.4)
EOSINOPHIL # BLD AUTO: 0.06 K/UL (ref 0–0.51)
EOSINOPHIL NFR BLD: 0.8 % (ref 0–6.9)
ERYTHROCYTE [DISTWIDTH] IN BLOOD BY AUTOMATED COUNT: 39.2 FL (ref 35.9–50)
EST. AVERAGE GLUCOSE BLD GHB EST-MCNC: 217 MG/DL
GLOBULIN SER CALC-MCNC: 3.2 G/DL (ref 1.9–3.5)
GLUCOSE BLD-MCNC: 189 MG/DL (ref 65–99)
GLUCOSE BLD-MCNC: 227 MG/DL (ref 65–99)
GLUCOSE BLD-MCNC: 239 MG/DL (ref 65–99)
GLUCOSE SERPL-MCNC: 242 MG/DL (ref 65–99)
HBA1C MFR BLD: 9.2 % (ref 4–5.6)
HCT VFR BLD AUTO: 35.6 % (ref 42–52)
HDLC SERPL-MCNC: 30 MG/DL
HGB BLD-MCNC: 12.5 G/DL (ref 14–18)
IMM GRANULOCYTES # BLD AUTO: 0.03 K/UL (ref 0–0.11)
IMM GRANULOCYTES NFR BLD AUTO: 0.4 % (ref 0–0.9)
LDLC SERPL CALC-MCNC: 43 MG/DL
LYMPHOCYTES # BLD AUTO: 1.03 K/UL (ref 1–4.8)
LYMPHOCYTES NFR BLD: 14 % (ref 22–41)
MCH RBC QN AUTO: 31.2 PG (ref 27–33)
MCHC RBC AUTO-ENTMCNC: 35.1 G/DL (ref 33.7–35.3)
MCV RBC AUTO: 88.8 FL (ref 81.4–97.8)
MONOCYTES # BLD AUTO: 0.44 K/UL (ref 0–0.85)
MONOCYTES NFR BLD AUTO: 6 % (ref 0–13.4)
NEUTROPHILS # BLD AUTO: 5.75 K/UL (ref 1.82–7.42)
NEUTROPHILS NFR BLD: 78.3 % (ref 44–72)
NRBC # BLD AUTO: 0 K/UL
NRBC BLD-RTO: 0 /100 WBC
PLATELET # BLD AUTO: 150 K/UL (ref 164–446)
PMV BLD AUTO: 11.4 FL (ref 9–12.9)
POTASSIUM SERPL-SCNC: 3.6 MMOL/L (ref 3.6–5.5)
PROT SERPL-MCNC: 6.5 G/DL (ref 6–8.2)
RBC # BLD AUTO: 4.01 M/UL (ref 4.7–6.1)
SODIUM SERPL-SCNC: 134 MMOL/L (ref 135–145)
TRIGL SERPL-MCNC: 391 MG/DL (ref 0–149)
WBC # BLD AUTO: 7.4 K/UL (ref 4.8–10.8)

## 2021-07-21 PROCEDURE — 700102 HCHG RX REV CODE 250 W/ 637 OVERRIDE(OP): Performed by: FAMILY MEDICINE

## 2021-07-21 PROCEDURE — 94760 N-INVAS EAR/PLS OXIMETRY 1: CPT

## 2021-07-21 PROCEDURE — 85025 COMPLETE CBC W/AUTO DIFF WBC: CPT

## 2021-07-21 PROCEDURE — 80061 LIPID PANEL: CPT

## 2021-07-21 PROCEDURE — 700111 HCHG RX REV CODE 636 W/ 250 OVERRIDE (IP): Performed by: HOSPITALIST

## 2021-07-21 PROCEDURE — 99232 SBSQ HOSP IP/OBS MODERATE 35: CPT | Performed by: FAMILY MEDICINE

## 2021-07-21 PROCEDURE — 700105 HCHG RX REV CODE 258: Performed by: HOSPITALIST

## 2021-07-21 PROCEDURE — 82962 GLUCOSE BLOOD TEST: CPT | Mod: 91

## 2021-07-21 PROCEDURE — 97165 OT EVAL LOW COMPLEX 30 MIN: CPT

## 2021-07-21 PROCEDURE — 700102 HCHG RX REV CODE 250 W/ 637 OVERRIDE(OP): Performed by: HOSPITALIST

## 2021-07-21 PROCEDURE — 36415 COLL VENOUS BLD VENIPUNCTURE: CPT

## 2021-07-21 PROCEDURE — 700111 HCHG RX REV CODE 636 W/ 250 OVERRIDE (IP): Performed by: FAMILY MEDICINE

## 2021-07-21 PROCEDURE — A9270 NON-COVERED ITEM OR SERVICE: HCPCS | Performed by: HOSPITALIST

## 2021-07-21 PROCEDURE — 770006 HCHG ROOM/CARE - MED/SURG/GYN SEMI*

## 2021-07-21 PROCEDURE — 700105 HCHG RX REV CODE 258: Performed by: FAMILY MEDICINE

## 2021-07-21 PROCEDURE — 80053 COMPREHEN METABOLIC PANEL: CPT

## 2021-07-21 PROCEDURE — 87641 MR-STAPH DNA AMP PROBE: CPT

## 2021-07-21 RX ADMIN — SODIUM CHLORIDE 3 G: 900 INJECTION INTRAVENOUS at 07:18

## 2021-07-21 RX ADMIN — INSULIN HUMAN 2 UNITS: 100 INJECTION, SOLUTION PARENTERAL at 17:19

## 2021-07-21 RX ADMIN — SODIUM CHLORIDE 3 G: 900 INJECTION INTRAVENOUS at 23:23

## 2021-07-21 RX ADMIN — DOCUSATE SODIUM 50 MG AND SENNOSIDES 8.6 MG 2 TABLET: 8.6; 5 TABLET, FILM COATED ORAL at 05:23

## 2021-07-21 RX ADMIN — OXYCODONE HYDROCHLORIDE 5 MG: 5 TABLET ORAL at 04:07

## 2021-07-21 RX ADMIN — OXYCODONE HYDROCHLORIDE 5 MG: 5 TABLET ORAL at 08:49

## 2021-07-21 RX ADMIN — SODIUM CHLORIDE 3 G: 900 INJECTION INTRAVENOUS at 11:36

## 2021-07-21 RX ADMIN — OXYCODONE HYDROCHLORIDE 5 MG: 5 TABLET ORAL at 23:22

## 2021-07-21 RX ADMIN — INSULIN HUMAN 3 UNITS: 100 INJECTION, SOLUTION PARENTERAL at 20:59

## 2021-07-21 RX ADMIN — ENOXAPARIN SODIUM 40 MG: 40 INJECTION SUBCUTANEOUS at 05:23

## 2021-07-21 RX ADMIN — INSULIN HUMAN 3 UNITS: 100 INJECTION, SOLUTION PARENTERAL at 05:26

## 2021-07-21 RX ADMIN — SODIUM CHLORIDE 3 G: 900 INJECTION INTRAVENOUS at 02:12

## 2021-07-21 RX ADMIN — OXYCODONE HYDROCHLORIDE 5 MG: 5 TABLET ORAL at 14:11

## 2021-07-21 RX ADMIN — SODIUM CHLORIDE 3 G: 900 INJECTION INTRAVENOUS at 17:15

## 2021-07-21 RX ADMIN — INSULIN GLARGINE 8 UNITS: 100 INJECTION, SOLUTION SUBCUTANEOUS at 17:19

## 2021-07-21 ASSESSMENT — COGNITIVE AND FUNCTIONAL STATUS - GENERAL
DAILY ACTIVITIY SCORE: 24
SUGGESTED CMS G CODE MODIFIER DAILY ACTIVITY: CH

## 2021-07-21 ASSESSMENT — GAIT ASSESSMENTS: DISTANCE (FEET): 15

## 2021-07-21 ASSESSMENT — ENCOUNTER SYMPTOMS
ABDOMINAL PAIN: 0
DIAPHORESIS: 0
CHILLS: 0
FEVER: 0
COUGH: 0
SHORTNESS OF BREATH: 0
VOMITING: 0
NAUSEA: 0

## 2021-07-21 ASSESSMENT — ACTIVITIES OF DAILY LIVING (ADL): TOILETING: INDEPENDENT

## 2021-07-21 ASSESSMENT — PAIN DESCRIPTION - PAIN TYPE
TYPE: ACUTE PAIN

## 2021-07-21 ASSESSMENT — PATIENT HEALTH QUESTIONNAIRE - PHQ9
2. FEELING DOWN, DEPRESSED, IRRITABLE, OR HOPELESS: NOT AT ALL
SUM OF ALL RESPONSES TO PHQ9 QUESTIONS 1 AND 2: 0
1. LITTLE INTEREST OR PLEASURE IN DOING THINGS: NOT AT ALL

## 2021-07-21 NOTE — FLOWSHEET NOTE
07/20/21 1835   Events/Summary/Plan   Events/Summary/Plan O2 Check   Vital Signs   Pulse 92   Respiration 18   Pulse Oximetry 97 %   $ Pulse Oximetry (Spot Check) Yes   Oxygen   O2 (LPM) 0   O2 Delivery Device Room air w/o2 available

## 2021-07-21 NOTE — THERAPY
Occupational Therapy   Initial Evaluation     Patient Name: Cristiano Soto  Age:  40 y.o., Sex:  male  Medical Record #: [id number]  Today's Date: 7/21/2021     Precautions  Comments: L knee pain and swelling    Assessment    Patient is 40 y.o. male with a diagnosis of L knee pain, septic bursitis, and cellulitis. Additional factors influencing patient status / progress: DM, asthmas, CAD, CHF, COPD, HTN, seizure d/o. Pt lives with wife and 2 kids -- ages 16 and 4. He was primarily I with all ADLs/IADLs, ambulates without AD use and drives at PLOF. He works FT. Pt presents today closely at baseline level, was able to tolerate most ADLs and functional mob without AD use at spv/Mod I level. Pt limps on L leg d/t pain, but no LOB noted. Encouraged to walk with nursing staff if L leg pain permits. Pt is still pending further workup of L knee bursitis. Patient will not be actively followed for occupational therapy services at this time, however may be seen if requested by physician for 1 more visit within 30 days to address any discharge or equipment needs.       Plan    Recommend Occupational Therapy for DC needs only    DC Equipment Recommendations: None  Discharge Recommendations: Anticipate that the patient will have no further occupational therapy needs after discharge from the hospital      Objective       07/21/21 1211   Prior Living Situation   Prior Services Home-Independent   Housing / Facility 2 Story Apartment / Condo   Steps Into Home 2   Steps In Home 10   Rail Both Rail (Steps in Home)   Elevator No   Bathroom Set up Bathtub / Shower Combination;Shower Curtain;Grab Bars   Equipment Owned Grab Bar(s) In Tub / Shower   Lives with - Patient's Self Care Capacity Spouse;Child Less than 18 Years of Age   Comments pt lives with wife and 2 kids - ages 16 and 4. pt was I with all ADLs/IADLs, works FT, and drives.   Prior Level of ADL Function   Self Feeding Independent   Grooming / Hygiene Independent    Bathing Independent   Dressing Independent   Toileting Independent   Prior Level of IADL Function   Medication Management Independent   Laundry Independent   Kitchen Mobility Independent   Finances Independent   Home Management Independent   Shopping Independent   Prior Level Of Mobility Independent Without Device in Community;Independent Without Device in Home   Driving / Transportation Driving Independent   Occupation (Pre-Hospital Vocational) Employed Full Time   History of Falls   History of Falls No   Precautions   Comments L knee pain and swelling   Pain   Pain Scales 0 to 10 Scale    Intervention Rest;Cold Pack   Pain 0 - 10 Group   Location Knee   Location Orientation Left   Pain Rating Scale (NPRS) 8   Description Aching;Constant   Comfort Goal Comfort with Movement;Perform Activity   Therapist Pain Assessment Post Activity Pain Same as Prior to Activity   Cognition    Cognition / Consciousness WDL   Level of Consciousness Alert   Comments pleasant and cooperative   Passive ROM Upper Body   Passive ROM Upper Body WDL   Active ROM Upper Body   Active ROM Upper Body  WDL   Strength Upper Body   Upper Body Strength  WDL   Balance Assessment   Sitting Balance (Static) Good   Sitting Balance (Dynamic) Good   Standing Balance (Static) Fair +   Standing Balance (Dynamic) Fair +   Weight Shift Sitting Good   Weight Shift Standing Fair   Comments no AD   Bed Mobility    Supine to Sit Modified Independent   Sit to Supine Modified Independent   Scooting Modified Independent   Comments HOB elevated, rails not utilized   ADL Assessment   Eating Independent   Grooming Standing;Modified Independent   Upper Body Dressing Modified Independent   Lower Body Dressing Modified Independent   Toileting Modified Independent   How much help from another person does the patient currently need...   Putting on and taking off regular lower body clothing? 4   Bathing (including washing, rinsing, and drying)? 4   Toileting, which  includes using a toilet, bedpan, or urinal? 4   Putting on and taking off regular upper body clothing? 4   Taking care of personal grooming such as brushing teeth? 4   Eating meals? 4   6 Clicks Daily Activity Score 24   Functional Mobility   Sit to Stand Supervised   Bed, Chair, Wheelchair Transfer Supervised   Toilet Transfers Supervised   Transfer Method Stand Step   Mobility in room without AD   Distance (Feet) 15   # of Times Distance was Traveled 2   Activity Tolerance   Sitting in Chair 1-2 mins toilet   Sitting Edge of Bed 5 mins   Standing 5 mins   Education Group   Education Provided Role of Occupational Therapist;Home Safety   Role of Occupational Therapist Patient Response Patient;Acceptance;Explanation   Home Safety Patient Response Patient;Acceptance;Explanation;Verbal Demonstration   Anticipated Discharge Equipment and Recommendations   DC Equipment Recommendations None   Discharge Recommendations Anticipate that the patient will have no further occupational therapy needs after discharge from the hospital   Interdisciplinary Plan of Care Collaboration   IDT Collaboration with  Nursing   Patient Position at End of Therapy In Bed;Call Light within Reach;Tray Table within Reach;Phone within Reach   Collaboration Comments RN aware of OT session and dc recs   Session Information   Date / Session Number  7/21 #1 (DC needs only)   Priority 0

## 2021-07-21 NOTE — CARE PLAN
Problem: Knowledge Deficit - Standard  Goal: Patient and family/care givers will demonstrate understanding of plan of care, disease process/condition, diagnostic tests and medications  Outcome: Progressing     Problem: Pain - Standard  Goal: Alleviation of pain or a reduction in pain to the patient’s comfort goal  Outcome: Progressing   The patient is Watcher - Medium risk of patient condition declining or worsening         Progress made toward(s) clinical / shift goals:  medicated for pain per MAR, discussed antibiotics with pt    Patient is not progressing towards the following goals:

## 2021-07-21 NOTE — PROGRESS NOTES
Report received from NOC RN, assumed care of pt.   POC and medications reviewed with pt. Pt verbalized understanding.   AOx4  Swelling to LEFT knee. Ice given.  Denies pain, SOB, or dizziness at this time.   Safety measures in place.  Hourly rounding in place.

## 2021-07-21 NOTE — CARE PLAN
Problem: Knowledge Deficit - Standard  Goal: Patient and family/care givers will demonstrate understanding of plan of care, disease process/condition, diagnostic tests and medications  Outcome: Progressing     Problem: Pain - Standard  Goal: Alleviation of pain or a reduction in pain to the patient’s comfort goal  Outcome: Progressing   The patient is Stable - Low risk of patient condition declining or worsening    Shift Goals  Clinical Goals: monitor swelling, pain control  Patient Goals: pain control, possibly discharge today    Progress made toward(s) clinical / shift goals:  IV antibiotics per MAR, evaluating pain levels and medicating as needed.    Patient is not progressing towards the following goals:

## 2021-07-21 NOTE — CARE PLAN
The patient is Stable - Low risk of patient condition declining or worsening    Shift Goals  Clinical Goals: Monitor swelling, temp  Patient Goals: pain control    Progress made toward(s) clinical / shift goals:  Patient is progressing towards goals.       Problem: Knowledge Deficit - Standard  Goal: Patient and family/care givers will demonstrate understanding of plan of care, disease process/condition, diagnostic tests and medications  Patient demonstrates appropriate knowledge of disease process/condition, treatment plan, diagnostic tests, and medications.Patient is compliant and asks appropriate questions about disease process and POC.   Outcome: Progressing     Problem: Communication  Goal: The ability to communicate needs accurately and effectively will improve  Patient communicates needs accurately and effectively, patient utilizes call light appropriately.  Patient asks appropriate questions and understands POC.   Outcome: Progressing     Problem: Mobility  Goal: Patient's capacity to carry out activities will improve  Outcome: Progressing       Patient is not progressing towards the following goals:

## 2021-07-21 NOTE — PROGRESS NOTES
[facility] Medicine Daily Progress Note    Date of Service  7/21/2021    Chief Complaint  Cristiano Soto is a 40 y.o. male admitted 7/20/2021 with knee pain    Hospital Course  Cristiano Soto is a 40 y.o. male who presents for evaluation of left knee infection.  It started 3 days ago, popping a pimple on the skin anterior to his left knee.  Ultrasound yesterday in ED revealed evidence of septic bursitis.  Patient has left anterior knee pain, no overt fever.  The area of redness is approximately tripled in has had a more difficult time bending at the left knee.  Denies trauma, recent procedures or aspirations.  No fevers or chills, endorses fatigue.     Patient is diabetic.    Previous A1c 9%, no dizziness.  No chest pain, no vomiting or diarrhea    Interval Problem Update  7/21 - Pt with BSs in the 200s, states he only takes Metformin at home. Denies fevers or chills, is stable here. Discussed with Ortho - will obtain MRI knee as the whole knee is red and swollen and would not like to risk seeding to tap the joint.  Continue Unasyn - pt denies a history of MRSA, has no indwelling hardware.     I have personally seen and examined the patient at bedside. I discussed the plan of care with patient.    Consultants/Specialty  n/a    Code Status  Full Code    Disposition  Patient is not medically cleared.   Anticipate discharge to to home with close outpatient follow-up.  I have placed the appropriate orders for post-discharge needs.    Review of Systems  Review of Systems   Constitutional: Negative for chills, diaphoresis and fever.   Respiratory: Negative for cough and shortness of breath.    Cardiovascular: Negative for chest pain and leg swelling.   Gastrointestinal: Negative for abdominal pain, nausea and vomiting.   Musculoskeletal: Positive for joint pain.   All other systems reviewed and are negative.       Physical Exam  Temp:  [36.4 °C (97.5 °F)-36.7 °C (98.1 °F)] 36.7 °C (98 °F)  Pulse:   [] 87  Resp:  [16-18] 18  BP: (136-169)/(73-98) 140/84  SpO2:  [94 %-99 %] 98 %    Physical Exam  Vitals and nursing note reviewed.   Constitutional:       Appearance: Normal appearance. He is not ill-appearing or toxic-appearing.   HENT:      Mouth/Throat:      Mouth: Mucous membranes are moist.   Cardiovascular:      Rate and Rhythm: Normal rate and regular rhythm.      Heart sounds: No murmur heard.     Pulmonary:      Effort: Pulmonary effort is normal. No respiratory distress.      Breath sounds: Normal breath sounds. No wheezing, rhonchi or rales.   Abdominal:      General: Abdomen is flat. Bowel sounds are normal. There is no distension.      Palpations: Abdomen is soft.      Tenderness: There is no abdominal tenderness. There is no guarding.   Skin:     General: Skin is warm.      Comments: Erythema to L knee, distinct swelling over pre-patellar bursa, good ROM   Neurological:      General: No focal deficit present.      Mental Status: He is alert and oriented to person, place, and time.         Fluids    Intake/Output Summary (Last 24 hours) at 7/21/2021 1147  Last data filed at 7/20/2021 1813  Gross per 24 hour   Intake 400 ml   Output --   Net 400 ml       Laboratory  Recent Labs     07/20/21  1408 07/21/21  0403   WBC 8.0 7.4   RBC 4.32* 4.01*   HEMOGLOBIN 13.6* 12.5*   HEMATOCRIT 37.7* 35.6*   MCV 87.3 88.8   MCH 31.5 31.2   MCHC 36.1* 35.1   RDW 38.8 39.2   PLATELETCT 153* 150*   MPV 11.2 11.4     Recent Labs     07/20/21  1408 07/21/21  0403   SODIUM 134* 134*   POTASSIUM 3.9 3.6   CHLORIDE 100 101   CO2 22 21   GLUCOSE 201* 242*   BUN 16 12   CREATININE 0.85 0.80   CALCIUM 8.9 8.4             Recent Labs     07/21/21  0403   TRIGLYCERIDE 391*   HDL 30*   LDL 43       Imaging  DX-KNEE COMPLETE 4+ LEFT   Final Result      1.  No evidence of fracture or bony destructive change.      2.  Soft tissue swelling.      MR-KNEE-WITH & W/O LEFT    (Results Pending)        Assessment/Plan  * Septic  bursitis  Assessment & Plan  - Started with small pimple which patient had popped, sent home from ED on p.o. antibiotics, returns with worsening swelling and pain  - Discussed with Dr Lara, will check MRI w/wo to evaluate for septic bursitis vs septic joint - would not tap at this time as his whole knee is warm and erythematous, would not risk seeding if septic joint not present  - Continue IV Unasyn for now, consider Ancef - will discuss with [facility].  Denies history of MRSA, will check nares swab.      Hyponatremia  Assessment & Plan  - Mild, monitor  - Corrects for glucose     Hypertriglyceridemia  Assessment & Plan  - Despite having hypertriglyceridemia, LDL is at goal - per guidelines, will discuss implementation with lifestyle modifications as initial intervention and PCP follow up as his levels continue to decline from 2019    Type 2 diabetes mellitus, without long-term current use of insulin (HCC)  Assessment & Plan  - A1c 9.2, pt states he takes Metformin only at home  - Start 8u Lantus at night for BSs in the 200s        VTE prophylaxis: enoxaparin ppx    I have performed a physical exam and reviewed and updated ROS and Plan today (7/21/2021). In review of yesterday's note (7/20/2021), there are no changes except as documented above.

## 2021-07-22 ENCOUNTER — APPOINTMENT (OUTPATIENT)
Dept: RADIOLOGY | Facility: MEDICAL CENTER | Age: 41
DRG: 580 | End: 2021-07-22
Attending: FAMILY MEDICINE

## 2021-07-22 ENCOUNTER — PATIENT OUTREACH (OUTPATIENT)
Dept: HEALTH INFORMATION MANAGEMENT | Facility: OTHER | Age: 41
End: 2021-07-22

## 2021-07-22 LAB
ANION GAP SERPL CALC-SCNC: 8 MMOL/L (ref 7–16)
BASOPHILS # BLD AUTO: 0.5 % (ref 0–1.8)
BASOPHILS # BLD: 0.04 K/UL (ref 0–0.12)
BUN SERPL-MCNC: 9 MG/DL (ref 8–22)
CALCIUM SERPL-MCNC: 8.4 MG/DL (ref 8.4–10.2)
CHLORIDE SERPL-SCNC: 101 MMOL/L (ref 96–112)
CO2 SERPL-SCNC: 25 MMOL/L (ref 20–33)
CREAT SERPL-MCNC: 0.66 MG/DL (ref 0.5–1.4)
EOSINOPHIL # BLD AUTO: 0.06 K/UL (ref 0–0.51)
EOSINOPHIL NFR BLD: 0.8 % (ref 0–6.9)
ERYTHROCYTE [DISTWIDTH] IN BLOOD BY AUTOMATED COUNT: 39 FL (ref 35.9–50)
GLUCOSE BLD-MCNC: 158 MG/DL (ref 65–99)
GLUCOSE BLD-MCNC: 202 MG/DL (ref 65–99)
GLUCOSE BLD-MCNC: 213 MG/DL (ref 65–99)
GLUCOSE BLD-MCNC: 257 MG/DL (ref 65–99)
GLUCOSE SERPL-MCNC: 186 MG/DL (ref 65–99)
HCT VFR BLD AUTO: 34.3 % (ref 42–52)
HGB BLD-MCNC: 12.4 G/DL (ref 14–18)
IMM GRANULOCYTES # BLD AUTO: 0.03 K/UL (ref 0–0.11)
IMM GRANULOCYTES NFR BLD AUTO: 0.4 % (ref 0–0.9)
LYMPHOCYTES # BLD AUTO: 1.08 K/UL (ref 1–4.8)
LYMPHOCYTES NFR BLD: 14.7 % (ref 22–41)
MCH RBC QN AUTO: 31.6 PG (ref 27–33)
MCHC RBC AUTO-ENTMCNC: 36.2 G/DL (ref 33.7–35.3)
MCV RBC AUTO: 87.5 FL (ref 81.4–97.8)
MONOCYTES # BLD AUTO: 0.47 K/UL (ref 0–0.85)
MONOCYTES NFR BLD AUTO: 6.4 % (ref 0–13.4)
MRSA DNA SPEC QL NAA+PROBE: NORMAL
NEUTROPHILS # BLD AUTO: 5.65 K/UL (ref 1.82–7.42)
NEUTROPHILS NFR BLD: 77.2 % (ref 44–72)
NRBC # BLD AUTO: 0 K/UL
NRBC BLD-RTO: 0 /100 WBC
PLATELET # BLD AUTO: 166 K/UL (ref 164–446)
PMV BLD AUTO: 11 FL (ref 9–12.9)
POTASSIUM SERPL-SCNC: 3.9 MMOL/L (ref 3.6–5.5)
RBC # BLD AUTO: 3.92 M/UL (ref 4.7–6.1)
SIGNIFICANT IND 70042: NORMAL
SITE SITE: NORMAL
SODIUM SERPL-SCNC: 134 MMOL/L (ref 135–145)
SOURCE SOURCE: NORMAL
WBC # BLD AUTO: 7.3 K/UL (ref 4.8–10.8)

## 2021-07-22 PROCEDURE — A9576 INJ PROHANCE MULTIPACK: HCPCS | Performed by: FAMILY MEDICINE

## 2021-07-22 PROCEDURE — 700111 HCHG RX REV CODE 636 W/ 250 OVERRIDE (IP): Performed by: FAMILY MEDICINE

## 2021-07-22 PROCEDURE — 99233 SBSQ HOSP IP/OBS HIGH 50: CPT | Performed by: FAMILY MEDICINE

## 2021-07-22 PROCEDURE — 80048 BASIC METABOLIC PNL TOTAL CA: CPT

## 2021-07-22 PROCEDURE — 700111 HCHG RX REV CODE 636 W/ 250 OVERRIDE (IP): Performed by: HOSPITALIST

## 2021-07-22 PROCEDURE — 36415 COLL VENOUS BLD VENIPUNCTURE: CPT

## 2021-07-22 PROCEDURE — A9270 NON-COVERED ITEM OR SERVICE: HCPCS | Performed by: HOSPITALIST

## 2021-07-22 PROCEDURE — 97162 PT EVAL MOD COMPLEX 30 MIN: CPT

## 2021-07-22 PROCEDURE — 82962 GLUCOSE BLOOD TEST: CPT | Mod: 91

## 2021-07-22 PROCEDURE — 700105 HCHG RX REV CODE 258: Performed by: FAMILY MEDICINE

## 2021-07-22 PROCEDURE — 85025 COMPLETE CBC W/AUTO DIFF WBC: CPT

## 2021-07-22 PROCEDURE — 700117 HCHG RX CONTRAST REV CODE 255: Performed by: FAMILY MEDICINE

## 2021-07-22 PROCEDURE — 73723 MRI JOINT LWR EXTR W/O&W/DYE: CPT | Mod: LT

## 2021-07-22 PROCEDURE — 770006 HCHG ROOM/CARE - MED/SURG/GYN SEMI*

## 2021-07-22 PROCEDURE — 94760 N-INVAS EAR/PLS OXIMETRY 1: CPT

## 2021-07-22 PROCEDURE — 700102 HCHG RX REV CODE 250 W/ 637 OVERRIDE(OP): Performed by: HOSPITALIST

## 2021-07-22 RX ORDER — LINEZOLID 2 MG/ML
600 INJECTION, SOLUTION INTRAVENOUS EVERY 12 HOURS
Status: DISCONTINUED | OUTPATIENT
Start: 2021-07-22 | End: 2021-07-24

## 2021-07-22 RX ADMIN — OXYCODONE HYDROCHLORIDE 5 MG: 5 TABLET ORAL at 05:53

## 2021-07-22 RX ADMIN — LINEZOLID 600 MG: 600 INJECTION, SOLUTION INTRAVENOUS at 17:46

## 2021-07-22 RX ADMIN — INSULIN HUMAN 5 UNITS: 100 INJECTION, SOLUTION PARENTERAL at 21:21

## 2021-07-22 RX ADMIN — GADOTERIDOL 20 ML: 279.3 INJECTION, SOLUTION INTRAVENOUS at 09:12

## 2021-07-22 RX ADMIN — INSULIN HUMAN 2 UNITS: 100 INJECTION, SOLUTION PARENTERAL at 05:47

## 2021-07-22 RX ADMIN — OXYCODONE HYDROCHLORIDE 5 MG: 5 TABLET ORAL at 19:50

## 2021-07-22 RX ADMIN — OXYCODONE HYDROCHLORIDE 5 MG: 5 TABLET ORAL at 16:12

## 2021-07-22 RX ADMIN — OXYCODONE HYDROCHLORIDE 5 MG: 5 TABLET ORAL at 10:07

## 2021-07-22 RX ADMIN — INSULIN HUMAN 3 UNITS: 100 INJECTION, SOLUTION PARENTERAL at 10:58

## 2021-07-22 RX ADMIN — ENOXAPARIN SODIUM 40 MG: 40 INJECTION SUBCUTANEOUS at 05:51

## 2021-07-22 RX ADMIN — SODIUM CHLORIDE 3 G: 900 INJECTION INTRAVENOUS at 05:42

## 2021-07-22 RX ADMIN — INSULIN HUMAN 3 UNITS: 100 INJECTION, SOLUTION PARENTERAL at 17:53

## 2021-07-22 ASSESSMENT — ENCOUNTER SYMPTOMS
CHILLS: 0
COUGH: 0
DIAPHORESIS: 0
ABDOMINAL PAIN: 0
FEVER: 0
VOMITING: 0
NAUSEA: 0
SHORTNESS OF BREATH: 0

## 2021-07-22 ASSESSMENT — COGNITIVE AND FUNCTIONAL STATUS - GENERAL
MOBILITY SCORE: 16
WALKING IN HOSPITAL ROOM: TOTAL
STANDING UP FROM CHAIR USING ARMS: A LITTLE
SUGGESTED CMS G CODE MODIFIER MOBILITY: CK
MOVING FROM LYING ON BACK TO SITTING ON SIDE OF FLAT BED: A LITTLE
CLIMB 3 TO 5 STEPS WITH RAILING: TOTAL

## 2021-07-22 ASSESSMENT — PAIN DESCRIPTION - PAIN TYPE
TYPE: ACUTE PAIN

## 2021-07-22 ASSESSMENT — GAIT ASSESSMENTS: GAIT LEVEL OF ASSIST: UNABLE TO PARTICIPATE

## 2021-07-22 NOTE — CARE PLAN
Problem: Knowledge Deficit - Standard  Goal: Patient and family/care givers will demonstrate understanding of plan of care, disease process/condition, diagnostic tests and medications  Outcome: Progressing     Problem: Pain - Standard  Goal: Alleviation of pain or a reduction in pain to the patient’s comfort goal  Outcome: Progressing     Problem: Psychosocial  Goal: Patient's level of anxiety will decrease  Outcome: Progressing     Problem: Communication  Goal: The ability to communicate needs accurately and effectively will improve  Outcome: Progressing     Problem: Hemodynamics  Goal: Patient's hemodynamics, fluid balance and neurologic status will be stable or improve  Outcome: Progressing     Problem: Mobility  Goal: Patient's capacity to carry out activities will improve  Outcome: Progressing   The patient is Watcher - Medium risk of patient condition declining or worsening    Shift Goals  Clinical Goals: Pain management   Patient Goals: rest comfortably     Progress made toward(s) clinical / shift goals:  All goals     Patient is not progressing towards the following goals:

## 2021-07-22 NOTE — THERAPY
Physical Therapy   Initial Evaluation     Patient Name: Cristiano Soto  Age:  40 y.o., Sex:  male  Medical Record #: [id number]  Today's Date: 7/22/2021     Precautions: (P) Fall Risk    Assessment  Patient is 40 y.o. male who was recently admitted for L knee pain and possible infection. Pt presented to PT with pain, impaired gait, impaired balance, and poor upright activity tolerance. Pt was only able to stand for about 45 seconds to 1 min with FWW use. Pt was unable to WB on L LE due to severe pain. Pt unable to ambulate at this time. Pt educated on supine therapeutic exercises and ankle pumps to promote mobility of L LE. Pt educated on elevation, icing, and positioning to avoid contractures and assist with dec swelling in L LE. Pt will continue to benefit from skilled PT while in house. Pt is unable to demonstrate safe upright mobility to d/c home at this time. Anticipate pt to progress well once pain is medically managed of L knee. Will continue to follow and update plan of care.     Plan    Recommend Physical Therapy 4 times per week until therapy goals are met for the following treatments:  Bed Mobility, Community Re-integration, Equipment, Gait Training, Manual Therapy, Neuro Re-Education / Balance, Self Care/Home Evaluation, Stair Training, Therapeutic Activities and Therapeutic Exercises    DC Equipment Recommendations: (P) Front-Wheel Walker  Discharge Recommendations: (P) Other - (depending on progress; see assessment in bold)     Objective       07/22/21 1135   Initial Contact Note    Initial Contact Note Order Received and Verified, Physical Therapy Evaluation in Progress with Full Report to Follow.   Precautions   Precautions Fall Risk   Comments L knee pain and swelling; no WB on L knee noted   Pain 0 - 10 Group   Location Knee   Location Orientation Left   Description Aching;Throbbing   Therapist Pain Assessment Post Activity;During Activity;Prior to Activity;Nurse Notified;8   Prior Living  Situation   Housing / Facility 2 Story Apartment / Condo   Steps Into Home 2   Steps In Home 10   Rail Both Rail (Steps in Home)   Elevator No   Equipment Owned Grab Bar(s) In Tub / Shower   Lives with - Patient's Self Care Capacity Spouse;Child Less than 18 Years of Age   Comments pt states spouse and children can assist upon d/c to home   Prior Level of Functional Mobility   Bed Mobility Independent   Transfer Status Independent   Ambulation Independent   Distance Ambulation (Feet)   (community )   Assistive Devices Used None   Stairs Independent   History of Falls   History of Falls No   Cognition    Cognition / Consciousness WDL   Level of Consciousness Alert   Passive ROM Lower Body   Passive ROM Lower Body X   Comments limited due to pain; able to demo 0 to 20 deg of L knee ROM   Active ROM Lower Body    Active ROM Lower Body  X   Comments limited due to pain; able to demo 0 to 10 deg of L knee ROM    Strength Lower Body   Lower Body Strength  X   Comments limited due to pain; able to demo 2-/5 strength for L LE    Sensation Lower Body   Lower Extremity Sensation   X   Comments very sensitive to touch at anterior knee   Lower Body Muscle Tone   Lower Body Muscle Tone  WDL   Strength Upper Body   Upper Body Strength  WDL   Upper Body Muscle Tone   Upper Body Muscle Tone  WDL   Neurological Concerns   Neurological Concerns No   Coordination Lower Body    Coordination Lower Body  WDL   Balance Assessment   Sitting Balance (Static) Good   Sitting Balance (Dynamic) Fair +   Standing Balance (Static) Fair   Standing Balance (Dynamic) Fair   Weight Shift Sitting Good   Weight Shift Standing Absent   Comments w/fww use; unable to WB on L LE    Gait Analysis   Gait Level Of Assist Unable to Participate   Weight Bearing Status fwb   Comments pt unable to take steps or side steps due to severe pain in L LE   Bed Mobility    Supine to Sit Modified Independent   Sit to Supine Modified Independent   Scooting Modified  Independent   Functional Mobility   Sit to Stand Supervised   Bed, Chair, Wheelchair Transfer Supervised   Transfer Method Stand Step   Mobility EOB, sit<>stand only, back to bed    How much difficulty does the patient currently have...   Turning over in bed (including adjusting bedclothes, sheets and blankets)? 4   Sitting down on and standing up from a chair with arms (e.g., wheelchair, bedside commode, etc.) 3   Moving from lying on back to sitting on the side of the bed? 4   How much help from another person does the patient currently need...   Moving to and from a bed to a chair (including a wheelchair)? 3   Need to walk in a hospital room? 1   Climbing 3-5 steps with a railing? 1   6 clicks Mobility Score 16   Activity Tolerance   Sitting in Chair NT   Sitting Edge of Bed 5 mins   Standing 1 mins   Comments limited due to severe pain in L knee   Edema / Skin Assessment   Edema / Skin  X   Comments presents with L knee swelling    Patient / Family Goals    Patient / Family Goal #1 to go home   Short Term Goals    Short Term Goal # 1 pt will ambulate for 150ft w/fww w/spv in 6tx for safe d/c home    Short Term Goal # 2 pt will go up/down 2 steps w/spv in 6tx for safe d/c home   Education Group   Education Provided Role of Physical Therapist;Use of Assistive Device   Role of Physical Therapist Patient Response Patient;Acceptance;Explanation;Demonstration;Verbal Demonstration;Action Demonstration   Use of Assistive Device Patient Response Patient;Acceptance;Explanation;Demonstration;Verbal Demonstration;Action Demonstration   Problem List    Problems Pain;Impaired Ambulation;Impaired Transfers;Impaired Balance;Decreased Activity Tolerance   Anticipated Discharge Equipment and Recommendations   DC Equipment Recommendations Front-Wheel Walker   Discharge Recommendations Other -  (depending on progress; see assessment in bold)   Interdisciplinary Plan of Care Collaboration   IDT Collaboration with  Nursing   Patient  Position at End of Therapy In Bed;Call Light within Reach;Tray Table within Reach;Phone within Reach   Collaboration Comments aware of visit and recs    Session Information   Date / Session Number  7/22-1 (1/4, 7/28)

## 2021-07-22 NOTE — PROGRESS NOTES
Hospital Medicine Daily Progress Note    Date of Service  7/22/2021    Chief Complaint  Cristiano Soto is a 40 y.o. male admitted 7/20/2021 with knee pain    Hospital Course  Cristiano Soto is a 40 y.o. male who presents for evaluation of left knee infection.  It started 3 days ago, popping a pimple on the skin anterior to his left knee.  Ultrasound yesterday in ED revealed evidence of septic bursitis.  Patient has left anterior knee pain, no overt fever.  The area of redness is approximately tripled in has had a more difficult time bending at the left knee.  Denies trauma, recent procedures or aspirations.  No fevers or chills, endorses fatigue.     Patient is diabetic.    Previous A1c 9%, no dizziness.  No chest pain, no vomiting or diarrhea    Interval Problem Update  7/21 - Pt with BSs in the 200s, states he only takes Metformin at home. Denies fevers or chills, is stable here. Discussed with Ortho - will obtain MRI knee as the whole knee is red and swollen and would not like to risk seeding to tap the joint.  Continue Unasyn - pt denies a history of MRSA, has no indwelling hardware.     7/22 - Erythema has spread some, increased pain per patient - given the worsening while on Unasyn and DM status, will add MRSA coverage by changing to Zyvox. [facility] to evaluate pt today. No fevers, BSs a little high.     I have personally seen and examined the patient at bedside. I discussed the plan of care with patient.    Consultants/Specialty  orthopedics and n/a       Code Status  Full Code    Disposition  Patient is not medically cleared.   Anticipate discharge to to home with close outpatient follow-up.  I have placed the appropriate orders for post-discharge needs.    Review of Systems  Review of Systems   Constitutional: Negative for chills, diaphoresis and fever.   Respiratory: Negative for cough and shortness of breath.    Cardiovascular: Negative for chest pain and leg swelling.    Gastrointestinal: Negative for abdominal pain, nausea and vomiting.   Musculoskeletal: Positive for joint pain.   Skin: Positive for rash.   All other systems reviewed and are negative.       Physical Exam  Temp:  [36.7 °C (98 °F)-36.8 °C (98.3 °F)] 36.8 °C (98.3 °F)  Pulse:  [] 87  Resp:  [18] 18  BP: (131-147)/(72-87) 131/72  SpO2:  [92 %-97 %] 97 %    Physical Exam  Vitals and nursing note reviewed.   Constitutional:       Appearance: Normal appearance. He is not ill-appearing or toxic-appearing.   HENT:      Mouth/Throat:      Mouth: Mucous membranes are moist.   Cardiovascular:      Rate and Rhythm: Normal rate and regular rhythm.      Heart sounds: No murmur heard.     Pulmonary:      Effort: Pulmonary effort is normal. No respiratory distress.      Breath sounds: Normal breath sounds. No wheezing, rhonchi or rales.   Abdominal:      General: Abdomen is flat. Bowel sounds are normal. There is no distension.      Palpations: Abdomen is soft.      Tenderness: There is no abdominal tenderness. There is no guarding.   Skin:     General: Skin is warm.      Comments: Erythema to L knee, slightly worse than yesterday, distinct swelling over pre-patellar bursa/patella, good ROM   Neurological:      General: No focal deficit present.      Mental Status: He is alert and oriented to person, place, and time.         Fluids    Intake/Output Summary (Last 24 hours) at 7/22/2021 1138  Last data filed at 7/22/2021 0745  Gross per 24 hour   Intake 720 ml   Output --   Net 720 ml       Laboratory  Recent Labs     07/20/21  1408 07/21/21  0403 07/22/21  0417   WBC 8.0 7.4 7.3   RBC 4.32* 4.01* 3.92*   HEMOGLOBIN 13.6* 12.5* 12.4*   HEMATOCRIT 37.7* 35.6* 34.3*   MCV 87.3 88.8 87.5   MCH 31.5 31.2 31.6   MCHC 36.1* 35.1 36.2*   RDW 38.8 39.2 39.0   PLATELETCT 153* 150* 166   MPV 11.2 11.4 11.0     Recent Labs     07/20/21  1408 07/21/21  0403 07/22/21  0417   SODIUM 134* 134* 134*   POTASSIUM 3.9 3.6 3.9   CHLORIDE 100  101 101   CO2 22 21 25   GLUCOSE 201* 242* 186*   BUN 16 12 9   CREATININE 0.85 0.80 0.66   CALCIUM 8.9 8.4 8.4             Recent Labs     07/21/21  0403   TRIGLYCERIDE 391*   HDL 30*   LDL 43       Imaging  MR-KNEE-WITH & W/O LEFT   Final Result      1.  No evidence of osteomyelitis.      2.  Extensive cellulitis with a subcutaneous rim-enhancing fluid collection anterior and lateral to the patella measuring 2.4 x 2.3 x 0.7 cm in size possibly representing subcutaneous abscess.      3.  Intact ligaments and menisci.      4.  Moderate sized joint effusion.      DX-KNEE COMPLETE 4+ LEFT   Final Result      1.  No evidence of fracture or bony destructive change.      2.  Soft tissue swelling.           Assessment/Plan  * Septic bursitis  Assessment & Plan  - Started with small pimple which patient had popped, sent home from ED on p.o. antibiotics, returns with worsening swelling and pain  - MRI with possibly subcutaneous abscess - abscess vs bursitis? Discussed with Dr Carlos, Dr Lara will evaluate the patient for drainage, appreciated.  - Given his lack of improvement on outpatient PO antibiotics and worsening of the pain and erythema on Unasyn, will change to Zyvox given his DM history      Hyponatremia  Assessment & Plan  - Mild, monitor  - Corrects for glucose     Hypertriglyceridemia  Assessment & Plan  - Despite having hypertriglyceridemia, LDL is at goal - per guidelines, will discuss implementation with lifestyle modifications as initial intervention and PCP follow up as his levels continue to decline from 2019    Type 2 diabetes mellitus, without long-term current use of insulin (HCC)  Assessment & Plan  - A1c 9.2, pt states he takes Metformin only at home  - Increase to 10u Lantus at night for BSs in the 200s        VTE prophylaxis: enoxaparin ppx    I have performed a physical exam and reviewed and updated ROS and Plan today (7/22/2021). In review of yesterday's note (7/21/2021), there are no  changes except as documented above.

## 2021-07-22 NOTE — ASSESSMENT & PLAN NOTE
- Despite having hypertriglyceridemia, LDL is at goal - per guidelines, will discuss implementation with lifestyle modifications as initial intervention and PCP follow up as his levels continue to decline from 2019

## 2021-07-22 NOTE — PROGRESS NOTES
7/22- CHW met with pt bedside to introduce CCM services. Pt states he currently does not know what his plan of care is at this time and requested a follow up call post d/c for possible needs.     Plan: CHW will call pt post d/c for assessment needs.     7/28- Call to pt post d/c. Pt has requested to speak with Norwegian fluent CHW for assessment. Will transfer pt over to [facility] for Norwegian assessment.

## 2021-07-22 NOTE — CARE PLAN
The patient is Stable - Low risk of patient condition declining or worsening    Shift Goals  Clinical Goals: Pain management  Patient Goals: rest comfortably     Progress made toward(s) clinical / shift goals:    Problem: Pain - Standard  Goal: Alleviation of pain or a reduction in pain to the patient’s comfort goal  Outcome: Progressing  Note: Patient able to communicate pain needs appropriately. Pain reassessments throughout shift performed.      Problem: Mobility  Goal: Patient's capacity to carry out activities will improve  Outcome: Progressing  Note: Patient able to ambulate self. Pain in L. Knee when ambulating.        Patient is not progressing towards the following goals:

## 2021-07-23 ENCOUNTER — ANESTHESIA (OUTPATIENT)
Dept: SURGERY | Facility: MEDICAL CENTER | Age: 41
DRG: 580 | End: 2021-07-23

## 2021-07-23 ENCOUNTER — ANESTHESIA EVENT (OUTPATIENT)
Dept: SURGERY | Facility: MEDICAL CENTER | Age: 41
DRG: 580 | End: 2021-07-23

## 2021-07-23 LAB
ANION GAP SERPL CALC-SCNC: 10 MMOL/L (ref 7–16)
BASOPHILS # BLD AUTO: 0.6 % (ref 0–1.8)
BASOPHILS # BLD: 0.04 K/UL (ref 0–0.12)
BUN SERPL-MCNC: 10 MG/DL (ref 8–22)
CALCIUM SERPL-MCNC: 8.4 MG/DL (ref 8.4–10.2)
CHLORIDE SERPL-SCNC: 101 MMOL/L (ref 96–112)
CO2 SERPL-SCNC: 25 MMOL/L (ref 20–33)
CREAT SERPL-MCNC: 0.65 MG/DL (ref 0.5–1.4)
EOSINOPHIL # BLD AUTO: 0.07 K/UL (ref 0–0.51)
EOSINOPHIL NFR BLD: 1 % (ref 0–6.9)
ERYTHROCYTE [DISTWIDTH] IN BLOOD BY AUTOMATED COUNT: 38.4 FL (ref 35.9–50)
GLUCOSE BLD-MCNC: 123 MG/DL (ref 65–99)
GLUCOSE BLD-MCNC: 139 MG/DL (ref 65–99)
GLUCOSE BLD-MCNC: 143 MG/DL (ref 65–99)
GLUCOSE BLD-MCNC: 163 MG/DL (ref 65–99)
GLUCOSE SERPL-MCNC: 147 MG/DL (ref 65–99)
HCT VFR BLD AUTO: 33 % (ref 42–52)
HGB BLD-MCNC: 11.8 G/DL (ref 14–18)
IMM GRANULOCYTES # BLD AUTO: 0.04 K/UL (ref 0–0.11)
IMM GRANULOCYTES NFR BLD AUTO: 0.6 % (ref 0–0.9)
LYMPHOCYTES # BLD AUTO: 1.01 K/UL (ref 1–4.8)
LYMPHOCYTES NFR BLD: 14.7 % (ref 22–41)
MCH RBC QN AUTO: 31.2 PG (ref 27–33)
MCHC RBC AUTO-ENTMCNC: 35.8 G/DL (ref 33.7–35.3)
MCV RBC AUTO: 87.3 FL (ref 81.4–97.8)
MONOCYTES # BLD AUTO: 0.46 K/UL (ref 0–0.85)
MONOCYTES NFR BLD AUTO: 6.7 % (ref 0–13.4)
NEUTROPHILS # BLD AUTO: 5.26 K/UL (ref 1.82–7.42)
NEUTROPHILS NFR BLD: 76.4 % (ref 44–72)
NRBC # BLD AUTO: 0 K/UL
NRBC BLD-RTO: 0 /100 WBC
PLATELET # BLD AUTO: 169 K/UL (ref 164–446)
PMV BLD AUTO: 10.6 FL (ref 9–12.9)
POTASSIUM SERPL-SCNC: 3.6 MMOL/L (ref 3.6–5.5)
RBC # BLD AUTO: 3.78 M/UL (ref 4.7–6.1)
SARS-COV+SARS-COV-2 AG RESP QL IA.RAPID: NOTDETECTED
SODIUM SERPL-SCNC: 136 MMOL/L (ref 135–145)
SPECIMEN SOURCE: NORMAL
WBC # BLD AUTO: 6.9 K/UL (ref 4.8–10.8)

## 2021-07-23 PROCEDURE — 85025 COMPLETE CBC W/AUTO DIFF WBC: CPT

## 2021-07-23 PROCEDURE — 82962 GLUCOSE BLOOD TEST: CPT

## 2021-07-23 PROCEDURE — 700111 HCHG RX REV CODE 636 W/ 250 OVERRIDE (IP): Performed by: ANESTHESIOLOGY

## 2021-07-23 PROCEDURE — 160036 HCHG PACU - EA ADDL 30 MINS PHASE I: Performed by: STUDENT IN AN ORGANIZED HEALTH CARE EDUCATION/TRAINING PROGRAM

## 2021-07-23 PROCEDURE — 160009 HCHG ANES TIME/MIN: Performed by: STUDENT IN AN ORGANIZED HEALTH CARE EDUCATION/TRAINING PROGRAM

## 2021-07-23 PROCEDURE — 160038 HCHG SURGERY MINUTES - EA ADDL 1 MIN LEVEL 2: Performed by: STUDENT IN AN ORGANIZED HEALTH CARE EDUCATION/TRAINING PROGRAM

## 2021-07-23 PROCEDURE — 36415 COLL VENOUS BLD VENIPUNCTURE: CPT

## 2021-07-23 PROCEDURE — 87102 FUNGUS ISOLATION CULTURE: CPT

## 2021-07-23 PROCEDURE — 160035 HCHG PACU - 1ST 60 MINS PHASE I: Performed by: STUDENT IN AN ORGANIZED HEALTH CARE EDUCATION/TRAINING PROGRAM

## 2021-07-23 PROCEDURE — 700111 HCHG RX REV CODE 636 W/ 250 OVERRIDE (IP): Performed by: HOSPITALIST

## 2021-07-23 PROCEDURE — 160048 HCHG OR STATISTICAL LEVEL 1-5: Performed by: STUDENT IN AN ORGANIZED HEALTH CARE EDUCATION/TRAINING PROGRAM

## 2021-07-23 PROCEDURE — 501838 HCHG SUTURE GENERAL: Performed by: STUDENT IN AN ORGANIZED HEALTH CARE EDUCATION/TRAINING PROGRAM

## 2021-07-23 PROCEDURE — A9270 NON-COVERED ITEM OR SERVICE: HCPCS | Performed by: ANESTHESIOLOGY

## 2021-07-23 PROCEDURE — 87186 SC STD MICRODIL/AGAR DIL: CPT

## 2021-07-23 PROCEDURE — 87070 CULTURE OTHR SPECIMN AEROBIC: CPT

## 2021-07-23 PROCEDURE — 0JDP0ZZ EXTRACTION OF LEFT LOWER LEG SUBCUTANEOUS TISSUE AND FASCIA, OPEN APPROACH: ICD-10-PCS | Performed by: STUDENT IN AN ORGANIZED HEALTH CARE EDUCATION/TRAINING PROGRAM

## 2021-07-23 PROCEDURE — 99233 SBSQ HOSP IP/OBS HIGH 50: CPT | Performed by: FAMILY MEDICINE

## 2021-07-23 PROCEDURE — 80048 BASIC METABOLIC PNL TOTAL CA: CPT

## 2021-07-23 PROCEDURE — 700105 HCHG RX REV CODE 258: Performed by: STUDENT IN AN ORGANIZED HEALTH CARE EDUCATION/TRAINING PROGRAM

## 2021-07-23 PROCEDURE — 87147 CULTURE TYPE IMMUNOLOGIC: CPT

## 2021-07-23 PROCEDURE — 87077 CULTURE AEROBIC IDENTIFY: CPT

## 2021-07-23 PROCEDURE — A6454 SELF-ADHER BAND W>=3" <5"/YD: HCPCS | Performed by: STUDENT IN AN ORGANIZED HEALTH CARE EDUCATION/TRAINING PROGRAM

## 2021-07-23 PROCEDURE — 500881 HCHG PACK, EXTREMITY: Performed by: STUDENT IN AN ORGANIZED HEALTH CARE EDUCATION/TRAINING PROGRAM

## 2021-07-23 PROCEDURE — 89051 BODY FLUID CELL COUNT: CPT

## 2021-07-23 PROCEDURE — 700102 HCHG RX REV CODE 250 W/ 637 OVERRIDE(OP): Performed by: ANESTHESIOLOGY

## 2021-07-23 PROCEDURE — 700101 HCHG RX REV CODE 250: Performed by: ANESTHESIOLOGY

## 2021-07-23 PROCEDURE — 87426 SARSCOV CORONAVIRUS AG IA: CPT

## 2021-07-23 PROCEDURE — 87075 CULTR BACTERIA EXCEPT BLOOD: CPT | Mod: 91

## 2021-07-23 PROCEDURE — 160027 HCHG SURGERY MINUTES - 1ST 30 MINS LEVEL 2: Performed by: STUDENT IN AN ORGANIZED HEALTH CARE EDUCATION/TRAINING PROGRAM

## 2021-07-23 PROCEDURE — A9270 NON-COVERED ITEM OR SERVICE: HCPCS | Performed by: STUDENT IN AN ORGANIZED HEALTH CARE EDUCATION/TRAINING PROGRAM

## 2021-07-23 PROCEDURE — A9270 NON-COVERED ITEM OR SERVICE: HCPCS | Performed by: HOSPITALIST

## 2021-07-23 PROCEDURE — 700101 HCHG RX REV CODE 250: Performed by: STUDENT IN AN ORGANIZED HEALTH CARE EDUCATION/TRAINING PROGRAM

## 2021-07-23 PROCEDURE — 700102 HCHG RX REV CODE 250 W/ 637 OVERRIDE(OP): Performed by: HOSPITALIST

## 2021-07-23 PROCEDURE — 700111 HCHG RX REV CODE 636 W/ 250 OVERRIDE (IP): Performed by: FAMILY MEDICINE

## 2021-07-23 PROCEDURE — 160002 HCHG RECOVERY MINUTES (STAT): Performed by: STUDENT IN AN ORGANIZED HEALTH CARE EDUCATION/TRAINING PROGRAM

## 2021-07-23 PROCEDURE — 700102 HCHG RX REV CODE 250 W/ 637 OVERRIDE(OP): Performed by: STUDENT IN AN ORGANIZED HEALTH CARE EDUCATION/TRAINING PROGRAM

## 2021-07-23 PROCEDURE — 770006 HCHG ROOM/CARE - MED/SURG/GYN SEMI*

## 2021-07-23 PROCEDURE — 87205 SMEAR GRAM STAIN: CPT | Mod: 91

## 2021-07-23 PROCEDURE — 0S9D3ZX DRAINAGE OF LEFT KNEE JOINT, PERCUTANEOUS APPROACH, DIAGNOSTIC: ICD-10-PCS | Performed by: STUDENT IN AN ORGANIZED HEALTH CARE EDUCATION/TRAINING PROGRAM

## 2021-07-23 RX ORDER — DIPHENHYDRAMINE HYDROCHLORIDE 50 MG/ML
12.5 INJECTION INTRAMUSCULAR; INTRAVENOUS
Status: DISCONTINUED | OUTPATIENT
Start: 2021-07-23 | End: 2021-07-23 | Stop reason: HOSPADM

## 2021-07-23 RX ORDER — HALOPERIDOL 5 MG/ML
1 INJECTION INTRAMUSCULAR
Status: DISCONTINUED | OUTPATIENT
Start: 2021-07-23 | End: 2021-07-23 | Stop reason: HOSPADM

## 2021-07-23 RX ORDER — HYDROMORPHONE HYDROCHLORIDE 1 MG/ML
0.4 INJECTION, SOLUTION INTRAMUSCULAR; INTRAVENOUS; SUBCUTANEOUS
Status: DISCONTINUED | OUTPATIENT
Start: 2021-07-23 | End: 2021-07-23 | Stop reason: HOSPADM

## 2021-07-23 RX ORDER — OXYCODONE HCL 5 MG/5 ML
5 SOLUTION, ORAL ORAL
Status: COMPLETED | OUTPATIENT
Start: 2021-07-23 | End: 2021-07-23

## 2021-07-23 RX ORDER — ACETAMINOPHEN 10 MG/ML
1000 INJECTION, SOLUTION INTRAVENOUS ONCE
Status: DISCONTINUED | OUTPATIENT
Start: 2021-07-23 | End: 2021-07-23 | Stop reason: HOSPADM

## 2021-07-23 RX ORDER — HYDROMORPHONE HYDROCHLORIDE 1 MG/ML
0.1 INJECTION, SOLUTION INTRAMUSCULAR; INTRAVENOUS; SUBCUTANEOUS
Status: DISCONTINUED | OUTPATIENT
Start: 2021-07-23 | End: 2021-07-23 | Stop reason: HOSPADM

## 2021-07-23 RX ORDER — OXYCODONE HCL 5 MG/5 ML
10 SOLUTION, ORAL ORAL
Status: COMPLETED | OUTPATIENT
Start: 2021-07-23 | End: 2021-07-23

## 2021-07-23 RX ORDER — DIPHENHYDRAMINE HYDROCHLORIDE 50 MG/ML
INJECTION INTRAMUSCULAR; INTRAVENOUS PRN
Status: DISCONTINUED | OUTPATIENT
Start: 2021-07-23 | End: 2021-07-23 | Stop reason: SURG

## 2021-07-23 RX ORDER — ONDANSETRON 2 MG/ML
4 INJECTION INTRAMUSCULAR; INTRAVENOUS
Status: COMPLETED | OUTPATIENT
Start: 2021-07-23 | End: 2021-07-23

## 2021-07-23 RX ORDER — ONDANSETRON 2 MG/ML
INJECTION INTRAMUSCULAR; INTRAVENOUS PRN
Status: DISCONTINUED | OUTPATIENT
Start: 2021-07-23 | End: 2021-07-23 | Stop reason: SURG

## 2021-07-23 RX ORDER — KETOROLAC TROMETHAMINE 30 MG/ML
INJECTION, SOLUTION INTRAMUSCULAR; INTRAVENOUS PRN
Status: DISCONTINUED | OUTPATIENT
Start: 2021-07-23 | End: 2021-07-23 | Stop reason: SURG

## 2021-07-23 RX ORDER — MEPERIDINE HYDROCHLORIDE 25 MG/ML
12.5 INJECTION INTRAMUSCULAR; INTRAVENOUS; SUBCUTANEOUS
Status: DISCONTINUED | OUTPATIENT
Start: 2021-07-23 | End: 2021-07-23 | Stop reason: HOSPADM

## 2021-07-23 RX ORDER — SODIUM CHLORIDE, SODIUM LACTATE, POTASSIUM CHLORIDE, CALCIUM CHLORIDE 600; 310; 30; 20 MG/100ML; MG/100ML; MG/100ML; MG/100ML
INJECTION, SOLUTION INTRAVENOUS CONTINUOUS
Status: DISCONTINUED | OUTPATIENT
Start: 2021-07-23 | End: 2021-07-23 | Stop reason: HOSPADM

## 2021-07-23 RX ORDER — LABETALOL HYDROCHLORIDE 5 MG/ML
5 INJECTION, SOLUTION INTRAVENOUS
Status: DISCONTINUED | OUTPATIENT
Start: 2021-07-23 | End: 2021-07-23 | Stop reason: HOSPADM

## 2021-07-23 RX ORDER — HYDROMORPHONE HYDROCHLORIDE 1 MG/ML
0.2 INJECTION, SOLUTION INTRAMUSCULAR; INTRAVENOUS; SUBCUTANEOUS
Status: DISCONTINUED | OUTPATIENT
Start: 2021-07-23 | End: 2021-07-23 | Stop reason: HOSPADM

## 2021-07-23 RX ORDER — SODIUM CHLORIDE, SODIUM LACTATE, POTASSIUM CHLORIDE, CALCIUM CHLORIDE 600; 310; 30; 20 MG/100ML; MG/100ML; MG/100ML; MG/100ML
INJECTION, SOLUTION INTRAVENOUS CONTINUOUS
Status: ACTIVE | OUTPATIENT
Start: 2021-07-23 | End: 2021-07-23

## 2021-07-23 RX ORDER — MIDAZOLAM HYDROCHLORIDE 1 MG/ML
1 INJECTION INTRAMUSCULAR; INTRAVENOUS
Status: DISCONTINUED | OUTPATIENT
Start: 2021-07-23 | End: 2021-07-23 | Stop reason: HOSPADM

## 2021-07-23 RX ORDER — BUPIVACAINE HYDROCHLORIDE AND EPINEPHRINE 2.5; 5 MG/ML; UG/ML
INJECTION, SOLUTION EPIDURAL; INFILTRATION; INTRACAUDAL; PERINEURAL
Status: DISCONTINUED | OUTPATIENT
Start: 2021-07-23 | End: 2021-07-23 | Stop reason: HOSPADM

## 2021-07-23 RX ADMIN — OXYCODONE HYDROCHLORIDE 10 MG: 5 SOLUTION ORAL at 19:30

## 2021-07-23 RX ADMIN — LABETALOL HYDROCHLORIDE 5 MG: 5 INJECTION, SOLUTION INTRAVENOUS at 20:05

## 2021-07-23 RX ADMIN — FENTANYL CITRATE 50 MCG: 50 INJECTION, SOLUTION INTRAMUSCULAR; INTRAVENOUS at 18:33

## 2021-07-23 RX ADMIN — PROPOFOL 150 MG: 10 INJECTION, EMULSION INTRAVENOUS at 18:28

## 2021-07-23 RX ADMIN — DIPHENHYDRAMINE HYDROCHLORIDE 12.5 MG: 50 INJECTION, SOLUTION INTRAMUSCULAR; INTRAVENOUS at 18:37

## 2021-07-23 RX ADMIN — POVIDONE IODINE 15 ML: 100 SOLUTION TOPICAL at 17:51

## 2021-07-23 RX ADMIN — LINEZOLID 600 MG: 600 INJECTION, SOLUTION INTRAVENOUS at 17:12

## 2021-07-23 RX ADMIN — FENTANYL CITRATE 25 MCG: 50 INJECTION, SOLUTION INTRAMUSCULAR; INTRAVENOUS at 19:18

## 2021-07-23 RX ADMIN — FENTANYL CITRATE 50 MCG: 50 INJECTION, SOLUTION INTRAMUSCULAR; INTRAVENOUS at 18:48

## 2021-07-23 RX ADMIN — FENTANYL CITRATE 100 MCG: 50 INJECTION, SOLUTION INTRAMUSCULAR; INTRAVENOUS at 18:44

## 2021-07-23 RX ADMIN — SODIUM CHLORIDE, POTASSIUM CHLORIDE, SODIUM LACTATE AND CALCIUM CHLORIDE: 600; 310; 30; 20 INJECTION, SOLUTION INTRAVENOUS at 18:23

## 2021-07-23 RX ADMIN — FENTANYL CITRATE 50 MCG: 50 INJECTION, SOLUTION INTRAMUSCULAR; INTRAVENOUS at 18:38

## 2021-07-23 RX ADMIN — FENTANYL CITRATE 25 MCG: 50 INJECTION, SOLUTION INTRAMUSCULAR; INTRAVENOUS at 19:31

## 2021-07-23 RX ADMIN — KETOROLAC TROMETHAMINE 30 MG: 30 INJECTION, SOLUTION INTRAMUSCULAR at 18:37

## 2021-07-23 RX ADMIN — ONDANSETRON 4 MG: 2 INJECTION INTRAMUSCULAR; INTRAVENOUS at 18:37

## 2021-07-23 RX ADMIN — HYDROMORPHONE HYDROCHLORIDE 0.25 MG: 1 INJECTION, SOLUTION INTRAMUSCULAR; INTRAVENOUS; SUBCUTANEOUS at 07:48

## 2021-07-23 RX ADMIN — LABETALOL HYDROCHLORIDE 5 MG: 5 INJECTION, SOLUTION INTRAVENOUS at 19:51

## 2021-07-23 RX ADMIN — INSULIN HUMAN 2 UNITS: 100 INJECTION, SOLUTION PARENTERAL at 21:00

## 2021-07-23 RX ADMIN — FENTANYL CITRATE 50 MCG: 50 INJECTION, SOLUTION INTRAMUSCULAR; INTRAVENOUS at 19:25

## 2021-07-23 RX ADMIN — OXYCODONE HYDROCHLORIDE 5 MG: 5 TABLET ORAL at 01:42

## 2021-07-23 RX ADMIN — HYDROMORPHONE HYDROCHLORIDE 0.25 MG: 1 INJECTION, SOLUTION INTRAMUSCULAR; INTRAVENOUS; SUBCUTANEOUS at 16:22

## 2021-07-23 RX ADMIN — LABETALOL HYDROCHLORIDE 5 MG: 5 INJECTION, SOLUTION INTRAVENOUS at 19:58

## 2021-07-23 RX ADMIN — LINEZOLID 600 MG: 600 INJECTION, SOLUTION INTRAVENOUS at 05:39

## 2021-07-23 ASSESSMENT — PAIN DESCRIPTION - PAIN TYPE
TYPE: SURGICAL PAIN
TYPE: ACUTE PAIN
TYPE: SURGICAL PAIN
TYPE: ACUTE PAIN
TYPE: SURGICAL PAIN

## 2021-07-23 ASSESSMENT — ENCOUNTER SYMPTOMS
COUGH: 0
VOMITING: 0
DIAPHORESIS: 0
ABDOMINAL PAIN: 0
NAUSEA: 0
FEVER: 0
SHORTNESS OF BREATH: 0
CHILLS: 0

## 2021-07-23 ASSESSMENT — PAIN SCALES - GENERAL: PAIN_LEVEL: 0

## 2021-07-23 NOTE — CONSULTS
Orthopaedic Consult Note      Cristiano Soto is a 40 y.o. male who presents after 1 week of left knee pain.  He indicates that he spontaneously developed a small abscess in the knee that they popped.  Got better for a few days and then 3 to 4 days ago seemingly the knee got more worse red swollen and warm to the touch.  It is bothering him now just on the anterior aspect of the knee.  He is denied systematic issues include night sweats fever chills unintentional weight loss.  He is a poorly controlled diabetic otherwise no medical history.  Independent ambulator able to put perform all activities of daily living.    Past Medical History:   Diagnosis Date   • DM (diabetes mellitus) (HCC)    • Other general symptoms(780.99)     hemorrhoids       History reviewed. No pertinent surgical history.    Medications  No current facility-administered medications on file prior to encounter.     Current Outpatient Medications on File Prior to Encounter   Medication Sig Dispense Refill   • amoxicillin (AMOXIL) 875 MG tablet Take 875 mg by mouth 2 times a day. Pt started on 7/19/2021 for 10 day course     • sulfamethoxazole-trimethoprim (BACTRIM DS) 800-160 MG tablet Take 1 tablet by mouth 2 times a day. Pt started on 7/19/2021 for 10 day course     • ibuprofen (MOTRIN) 600 MG Tab Take 600 mg by mouth every 6 hours. Pt started on 7/19/2021     • acetaminophen (TYLENOL) 325 MG Tab Take 650 mg by mouth every four hours as needed for Moderate Pain.     • metformin (GLUCOPHAGE) 1000 MG tablet Take 1,000 mg by mouth every day.         Allergies  Nkda [no known drug allergy]    ROS  All other systems were reviewed and found to be negative    History reviewed. No pertinent family history.    Social History     Socioeconomic History   • Marital status:      Spouse name: Not on file   • Number of children: Not on file   • Years of education: Not on file   • Highest education level: Not on file   Occupational History   •  "Not on file   Tobacco Use   • Smoking status: Former Smoker   • Smokeless tobacco: Never Used   Substance and Sexual Activity   • Alcohol use: Not Currently     Comment: occasionally   • Drug use: No   • Sexual activity: Not on file   Other Topics Concern   • Not on file   Social History Narrative   • Not on file     Social Determinants of Health     Financial Resource Strain:    • Difficulty of Paying Living Expenses:    Food Insecurity:    • Worried About Running Out of Food in the Last Year:    • Ran Out of Food in the Last Year:    Transportation Needs:    • Lack of Transportation (Medical):    • Lack of Transportation (Non-Medical):    Physical Activity:    • Days of Exercise per Week:    • Minutes of Exercise per Session:    Stress:    • Feeling of Stress :    Social Connections:    • Frequency of Communication with Friends and Family:    • Frequency of Social Gatherings with Friends and Family:    • Attends Taoism Services:    • Active Member of Clubs or Organizations:    • Attends Club or Organization Meetings:    • Marital Status:    Intimate Partner Violence:    • Fear of Current or Ex-Partner:    • Emotionally Abused:    • Physically Abused:    • Sexually Abused:        Physical Exam  Vitals  /72   Pulse 98   Temp 37.1 °C (98.7 °F) (Temporal)   Resp 18   Ht 1.68 m (5' 6.14\")   Wt 89.3 kg (196 lb 13.9 oz)   SpO2 97%   General: No acute distress alert and oriented x3.  Cooperative breathing room air conversing appropriately  Skin: Intact, no open wounds unless otherwise noted below  Extremities:  Bilateral upper right lower extremity: Skin is atraumatic.  No tenderness palpation in any region of the limbs.  Skin is intact painless range of motion joints.  Grossly neurovascular intact    Left lower extremity:  Patient does have a area of erythema over the anterior knee.  Otherwise patient has largely nonerythematous knee.  Short arc motion does not cause pain he just feels tightness in the " anterior aspect of the knee with pain.  It is significantly tender in this area localized to the anterior knee.  No tenderness palpation along joint lines or palpation of the knee posteriorly.  Calf soft supple negative Davis.  Able to fire EHL FHL gastrocsoleus complex and tib ant.  Sensation to touch intact light touch superficial and deep peroneal, tibial, sural saphenous nerves.  Palpable DP pulse foot is warm well perfused    Radiographs:  MR-KNEE-WITH & W/O LEFT   Final Result      1.  No evidence of osteomyelitis.      2.  Extensive cellulitis with a subcutaneous rim-enhancing fluid collection anterior and lateral to the patella measuring 2.4 x 2.3 x 0.7 cm in size possibly representing subcutaneous abscess.      3.  Intact ligaments and menisci.      4.  Moderate sized joint effusion.      DX-KNEE COMPLETE 4+ LEFT   Final Result      1.  No evidence of fracture or bony destructive change.      2.  Soft tissue swelling.          Laboratory Values  Recent Labs     07/20/21  1408 07/21/21  0403 07/22/21  0417   WBC 8.0 7.4 7.3   RBC 4.32* 4.01* 3.92*   HEMOGLOBIN 13.6* 12.5* 12.4*   HEMATOCRIT 37.7* 35.6* 34.3*   MCV 87.3 88.8 87.5   MCH 31.5 31.2 31.6   MCHC 36.1* 35.1 36.2*   RDW 38.8 39.2 39.0   PLATELETCT 153* 150* 166   MPV 11.2 11.4 11.0     Recent Labs     07/20/21  1408 07/21/21  0403 07/22/21  0417   SODIUM 134* 134* 134*   POTASSIUM 3.9 3.6 3.9   CHLORIDE 100 101 101   CO2 22 21 25   GLUCOSE 201* 242* 186*   BUN 16 12 9         Imaging:  Imaging does demonstrate prepatellar abscess approximately 2 x 2 x 1 cm.  No evidence of septic joint    Impression:  Left knee prepatellar abscess    Plan: Discussed nature diagnosis.  Went over options with him.  He has essentially failed antibiotic treatment at this point.  He is still having ongoing pain difficulty with activities.  I did discuss ongoing nonoperative conservative measures.  I also discussed surgery specifically abscess decompression.  After going  over risks and benefits of each patient elected proceed with surgery.  I discussed multiple risks of surgery including ongoing infection chronic wound healing problems chronic pain worsening infection nonhealing wounds arthrofibrosis as well as prolonged antibiotic therapy need for revision surgery Cardiopulmonary risk related anesthesia including heart attack pulmonary embolism and death.  After going over some benefits patient elected proceed    Plan  -N.p.o. past midnight  -I&D left knee in the morning

## 2021-07-23 NOTE — CARE PLAN
Problem: Knowledge Deficit - Standard  Goal: Patient and family/care givers will demonstrate understanding of plan of care, disease process/condition, diagnostic tests and medications  Outcome: Progressing     Problem: Pain - Standard  Goal: Alleviation of pain or a reduction in pain to the patient’s comfort goal  Outcome: Progressing     Problem: Psychosocial  Goal: Patient's level of anxiety will decrease  Outcome: Progressing     Problem: Communication  Goal: The ability to communicate needs accurately and effectively will improve  Outcome: Progressing     Problem: Hemodynamics  Goal: Patient's hemodynamics, fluid balance and neurologic status will be stable or improve  Outcome: Progressing     Problem: Mobility  Goal: Patient's capacity to carry out activities will improve  Outcome: Progressing   The patient is Stable - Low risk of patient condition declining or worsening    Shift Goals  Clinical Goals: pain management   Patient Goals: rest comfortably     Progress made toward(s) clinical / shift goals:  all goals     Patient is not progressing towards the following goals:

## 2021-07-23 NOTE — PROGRESS NOTES
Received report. Patient A&Ox4, reports pain, medicated per MAR. Denies N/V, SOB. Patient NPO as of midnight awaiting surgery. Bed in lowest, locked position. Call light and belongings in reach. Patient denies further needs at this time.

## 2021-07-23 NOTE — DISCHARGE PLANNING
Anticipated Discharge Disposition: Home     Action: Pt anticipated to d/c tomorrow 7/24. No d/c needs at this time. Pt has a six clicks score of 24.     Pt listed as a 10x10.     Barriers to Discharge: None    Plan: care management to continue to follow for d/c needs      Care Transition Team Assessment    Information Source  Orientation Level: Oriented X4  Information Given By: Other (Comments)  Who is responsible for making decisions for patient? : Patient    Elopement Risk  Legal Hold: No  Ambulatory or Self Mobile in Wheelchair: Yes  Disoriented: No  Psychiatric Symptoms: None  History of Wandering: No  Elopement this Admit: No  Vocalizing Wanting to Leave: No  Displays Behaviors, Body Language Wanting to Leave: No-Not at Risk for Elopement  Elopement Risk: Not at Risk for Elopement    Interdisciplinary Discharge Planning  Lives with - Patient's Self Care Capacity: Spouse, Child Less than 18 Years of Age  Patient or legal guardian wants to designate a caregiver: No  Support Systems: Family Member(s)  Housing / Facility: 2 Story Apartment / Condo  Prior Services: Home-Independent    Discharge Preparedness  What is your plan after discharge?: Home with help  What are your discharge supports?: Sibling  Prior Functional Level: Independent with Activities of Daily Living, Independent with Medication Management    Functional Assesment  Prior Functional Level: Independent with Activities of Daily Living, Independent with Medication Management    Finances  Financial Barriers to Discharge: No  Prescription Coverage: No    Vision / Hearing Impairment  Vision Impairment : No  Hearing Impairment : No    Domestic Abuse  Have you ever been the victim of abuse or violence?: No  Physical Abuse or Sexual Abuse: No  Verbal Abuse or Emotional Abuse: No  Possible Abuse/Neglect Reported to:: Not Applicable    Psychological Assessment  History of Substance Abuse: None  History of Psychiatric Problems: No    Discharge Risks or  Barriers  Discharge risks or barriers?: No    Anticipated Discharge Information  Discharge Disposition: Discharged to home/self care (01)

## 2021-07-23 NOTE — CARE PLAN
The patient is Stable - Low risk of patient condition declining or worsening    Shift Goals  Clinical Goals: Pain management  Patient Goals: rest comfortably     Progress made toward(s) clinical / shift goals:    Problem: Pain - Standard  Goal: Alleviation of pain or a reduction in pain to the patient’s comfort goal  Outcome: Progressing  Note: Patient able to communicate pain appropriately. Pain reassessed throughout shift.      Patient is not progressing towards the following goals: N/A

## 2021-07-23 NOTE — PROGRESS NOTES
Hospital Medicine Daily Progress Note    Date of Service  7/23/2021    Chief Complaint  Cristiano Soto is a 40 y.o. male admitted 7/20/2021 with knee pain    Hospital Course  Cristiano Soto is a 40 y.o. male who presents for evaluation of left knee infection.  It started 3 days ago, popping a pimple on the skin anterior to his left knee.  Ultrasound yesterday in ED revealed evidence of septic bursitis.  Patient has left anterior knee pain, no overt fever.  The area of redness is approximately tripled in has had a more difficult time bending at the left knee.  Denies trauma, recent procedures or aspirations.  No fevers or chills, endorses fatigue.     Patient is diabetic.    Previous A1c 9%, no dizziness.  No chest pain, no vomiting or diarrhea    Interval Problem Update  7/21 - Pt with BSs in the 200s, states he only takes Metformin at home. Denies fevers or chills, is stable here. Discussed with Ortho - will obtain MRI knee as the whole knee is red and swollen and would not like to risk seeding to tap the joint.  Continue Unasyn - pt denies a history of MRSA, has no indwelling hardware.     7/22 - Erythema has spread some, increased pain per patient - given the worsening while on Unasyn and DM status, will add MRSA coverage by changing to Zyvox. [facility] to evaluate pt today. No fevers, BSs a little high.     7/23 - Pt still without improvement, going to the OR this morning.  Discussed his DM with him - has been on Metformin + Glipizide in the past but developed hypoglycemia. Has diarrhea with 1000mg BID Metformin, but is amenable to trying 500mg at night along with his 1000mg qam. Afebrile, no white count, having difficulty ambulating due to knee pain.    I have personally seen and examined the patient at bedside. I discussed the plan of care with patient.    Consultants/Specialty  orthopedics and n/a       Code Status  Full Code    Disposition  Patient is not medically cleared.    Anticipate discharge to to home with close outpatient follow-up.  I have placed the appropriate orders for post-discharge needs.    Review of Systems  Review of Systems   Constitutional: Negative for chills, diaphoresis and fever.   Respiratory: Negative for cough and shortness of breath.    Cardiovascular: Negative for chest pain and leg swelling.   Gastrointestinal: Negative for abdominal pain, nausea and vomiting.   Musculoskeletal: Positive for joint pain.   Skin: Positive for rash.   All other systems reviewed and are negative.       Physical Exam  Temp:  [36.8 °C (98.3 °F)-37.6 °C (99.7 °F)] 37 °C (98.6 °F)  Pulse:  [87-98] 91  Resp:  [18] 18  BP: (129-143)/(69-72) 143/71  SpO2:  [95 %-97 %] 95 %    Physical Exam  Vitals and nursing note reviewed.   Constitutional:       Appearance: Normal appearance. He is not ill-appearing or toxic-appearing.   HENT:      Mouth/Throat:      Mouth: Mucous membranes are moist.   Cardiovascular:      Rate and Rhythm: Normal rate and regular rhythm.      Heart sounds: No murmur heard.     Pulmonary:      Effort: Pulmonary effort is normal. No respiratory distress.      Breath sounds: Normal breath sounds. No wheezing, rhonchi or rales.   Abdominal:      General: Abdomen is flat. Bowel sounds are normal. There is no distension.      Palpations: Abdomen is soft.      Tenderness: There is no abdominal tenderness. There is no guarding.   Skin:     General: Skin is warm.      Comments: Erythema to L knee, slightly worse than yesterday, distinct swelling over pre-patellar bursa/patella, good ROM   Neurological:      General: No focal deficit present.      Mental Status: He is alert and oriented to person, place, and time.         Fluids    Intake/Output Summary (Last 24 hours) at 7/23/2021 0822  Last data filed at 7/22/2021 2000  Gross per 24 hour   Intake 1050 ml   Output --   Net 1050 ml       Laboratory  Recent Labs     07/21/21  0403 07/22/21  0417 07/23/21  0415   WBC 7.4 7.3  6.9   RBC 4.01* 3.92* 3.78*   HEMOGLOBIN 12.5* 12.4* 11.8*   HEMATOCRIT 35.6* 34.3* 33.0*   MCV 88.8 87.5 87.3   MCH 31.2 31.6 31.2   MCHC 35.1 36.2* 35.8*   RDW 39.2 39.0 38.4   PLATELETCT 150* 166 169   MPV 11.4 11.0 10.6     Recent Labs     07/21/21  0403 07/22/21  0417 07/23/21  0415   SODIUM 134* 134* 136   POTASSIUM 3.6 3.9 3.6   CHLORIDE 101 101 101   CO2 21 25 25   GLUCOSE 242* 186* 147*   BUN 12 9 10   CREATININE 0.80 0.66 0.65   CALCIUM 8.4 8.4 8.4             Recent Labs     07/21/21  0403   TRIGLYCERIDE 391*   HDL 30*   LDL 43       Imaging  MR-KNEE-WITH & W/O LEFT   Final Result      1.  No evidence of osteomyelitis.      2.  Extensive cellulitis with a subcutaneous rim-enhancing fluid collection anterior and lateral to the patella measuring 2.4 x 2.3 x 0.7 cm in size possibly representing subcutaneous abscess.      3.  Intact ligaments and menisci.      4.  Moderate sized joint effusion.      DX-KNEE COMPLETE 4+ LEFT   Final Result      1.  No evidence of fracture or bony destructive change.      2.  Soft tissue swelling.           Assessment/Plan  * Septic bursitis  Assessment & Plan  - Started with small pimple which patient had popped, sent home from ED on p.o. antibiotics, returns with worsening swelling and pain  - MRI with possibly subcutaneous abscess - abscess vs bursitis?  - Given his lack of improvement on outpatient PO antibiotics and worsening of the pain and erythema on Unasyn, changed to Zyvox given his DM history  - To OR today for washout      Hyponatremia  Assessment & Plan  - Corrects for glucose   - RESOLVED     Hypertriglyceridemia  Assessment & Plan  - Despite having hypertriglyceridemia, LDL is at goal - per guidelines, will discuss implementation with lifestyle modifications as initial intervention and PCP follow up as his levels continue to decline from 2019    Type 2 diabetes mellitus, without long-term current use of insulin (HCC)  Assessment & Plan  - A1c 9.2, pt states he  takes Metformin only at home  - Discussed his DM with him - has been on Metformin + Glipizide in the past but developed hypoglycemia. Has diarrhea with 1000mg BID Metformin, but is amenable to trying 500mg at night along with his 1000mg qam.  - Increased to 10u Lantus at night for BSs in the 200s with good FBS this am       VTE prophylaxis: enoxaparin ppx    I have performed a physical exam and reviewed and updated ROS and Plan today (7/23/2021). In review of yesterday's note (7/22/2021), there are no changes except as documented above.

## 2021-07-24 PROBLEM — L02.419: Status: ACTIVE | Noted: 2021-07-20

## 2021-07-24 LAB
APPEARANCE FLD: NORMAL
BODY FLD TYPE: NORMAL
COLOR FLD: YELLOW
CSF COMMENTS 1658: NORMAL
FUNGUS SPEC FUNGUS STN: NORMAL
FUNGUS SPEC FUNGUS STN: NORMAL
GLUCOSE BLD-MCNC: 188 MG/DL (ref 65–99)
GLUCOSE BLD-MCNC: 202 MG/DL (ref 65–99)
GLUCOSE BLD-MCNC: 234 MG/DL (ref 65–99)
GRAM STN SPEC: NORMAL
LYMPHOCYTES NFR FLD: 64 %
MONONUC CELLS NFR FLD: 25 %
NEUTROPHILS NFR FLD: 6 %
RBC # FLD: <2000 CELLS/UL
SIGNIFICANT IND 70042: NORMAL
SITE SITE: NORMAL
SOURCE SOURCE: NORMAL
WBC # FLD: 179 CELLS/UL
WBC OTHER NFR FLD: 5 %

## 2021-07-24 PROCEDURE — 97110 THERAPEUTIC EXERCISES: CPT | Performed by: PHYSICAL THERAPIST

## 2021-07-24 PROCEDURE — 82962 GLUCOSE BLOOD TEST: CPT | Mod: 91

## 2021-07-24 PROCEDURE — 97530 THERAPEUTIC ACTIVITIES: CPT | Performed by: PHYSICAL THERAPIST

## 2021-07-24 PROCEDURE — A9270 NON-COVERED ITEM OR SERVICE: HCPCS | Performed by: FAMILY MEDICINE

## 2021-07-24 PROCEDURE — 700111 HCHG RX REV CODE 636 W/ 250 OVERRIDE (IP): Performed by: FAMILY MEDICINE

## 2021-07-24 PROCEDURE — 97116 GAIT TRAINING THERAPY: CPT | Performed by: PHYSICAL THERAPIST

## 2021-07-24 PROCEDURE — A9270 NON-COVERED ITEM OR SERVICE: HCPCS | Performed by: HOSPITALIST

## 2021-07-24 PROCEDURE — 770006 HCHG ROOM/CARE - MED/SURG/GYN SEMI*

## 2021-07-24 PROCEDURE — 700102 HCHG RX REV CODE 250 W/ 637 OVERRIDE(OP): Performed by: FAMILY MEDICINE

## 2021-07-24 PROCEDURE — 700111 HCHG RX REV CODE 636 W/ 250 OVERRIDE (IP): Performed by: HOSPITALIST

## 2021-07-24 PROCEDURE — 700102 HCHG RX REV CODE 250 W/ 637 OVERRIDE(OP): Performed by: HOSPITALIST

## 2021-07-24 PROCEDURE — 99233 SBSQ HOSP IP/OBS HIGH 50: CPT | Performed by: FAMILY MEDICINE

## 2021-07-24 RX ORDER — METFORMIN HYDROCHLORIDE 500 MG/1
500 TABLET, EXTENDED RELEASE ORAL
Status: DISCONTINUED | OUTPATIENT
Start: 2021-07-24 | End: 2021-07-26 | Stop reason: HOSPADM

## 2021-07-24 RX ORDER — LINEZOLID 600 MG/1
600 TABLET, FILM COATED ORAL EVERY 12 HOURS
Status: DISCONTINUED | OUTPATIENT
Start: 2021-07-24 | End: 2021-07-26 | Stop reason: HOSPADM

## 2021-07-24 RX ORDER — HYDROMORPHONE HYDROCHLORIDE 1 MG/ML
0.5 INJECTION, SOLUTION INTRAMUSCULAR; INTRAVENOUS; SUBCUTANEOUS
Status: DISCONTINUED | OUTPATIENT
Start: 2021-07-24 | End: 2021-07-26 | Stop reason: HOSPADM

## 2021-07-24 RX ORDER — OXYCODONE HYDROCHLORIDE 5 MG/1
5 TABLET ORAL
Status: DISCONTINUED | OUTPATIENT
Start: 2021-07-24 | End: 2021-07-26 | Stop reason: HOSPADM

## 2021-07-24 RX ORDER — LISINOPRIL 5 MG/1
5 TABLET ORAL
Status: DISCONTINUED | OUTPATIENT
Start: 2021-07-24 | End: 2021-07-26 | Stop reason: HOSPADM

## 2021-07-24 RX ORDER — OXYCODONE HYDROCHLORIDE 10 MG/1
10 TABLET ORAL
Status: DISCONTINUED | OUTPATIENT
Start: 2021-07-24 | End: 2021-07-26 | Stop reason: HOSPADM

## 2021-07-24 RX ADMIN — LISINOPRIL 5 MG: 5 TABLET ORAL at 07:41

## 2021-07-24 RX ADMIN — ACETAMINOPHEN 650 MG: 325 TABLET, FILM COATED ORAL at 22:10

## 2021-07-24 RX ADMIN — METFORMIN HYDROCHLORIDE 1000 MG: 500 TABLET ORAL at 07:41

## 2021-07-24 RX ADMIN — DOCUSATE SODIUM 50 MG AND SENNOSIDES 8.6 MG 2 TABLET: 8.6; 5 TABLET, FILM COATED ORAL at 17:20

## 2021-07-24 RX ADMIN — OXYCODONE HYDROCHLORIDE 10 MG: 10 TABLET ORAL at 07:41

## 2021-07-24 RX ADMIN — METFORMIN HYDROCHLORIDE 500 MG: 500 TABLET, EXTENDED RELEASE ORAL at 17:20

## 2021-07-24 RX ADMIN — INSULIN HUMAN 3 UNITS: 100 INJECTION, SOLUTION PARENTERAL at 10:24

## 2021-07-24 RX ADMIN — OXYCODONE HYDROCHLORIDE 10 MG: 10 TABLET ORAL at 11:33

## 2021-07-24 RX ADMIN — HYDROMORPHONE HYDROCHLORIDE 0.25 MG: 1 INJECTION, SOLUTION INTRAMUSCULAR; INTRAVENOUS; SUBCUTANEOUS at 05:24

## 2021-07-24 RX ADMIN — LINEZOLID 600 MG: 600 INJECTION, SOLUTION INTRAVENOUS at 05:29

## 2021-07-24 RX ADMIN — HYDROMORPHONE HYDROCHLORIDE 0.5 MG: 1 INJECTION, SOLUTION INTRAMUSCULAR; INTRAVENOUS; SUBCUTANEOUS at 09:14

## 2021-07-24 RX ADMIN — OXYCODONE HYDROCHLORIDE 10 MG: 10 TABLET ORAL at 22:09

## 2021-07-24 RX ADMIN — HYDROMORPHONE HYDROCHLORIDE 0.25 MG: 1 INJECTION, SOLUTION INTRAMUSCULAR; INTRAVENOUS; SUBCUTANEOUS at 04:43

## 2021-07-24 RX ADMIN — INSULIN HUMAN 2 UNITS: 100 INJECTION, SOLUTION PARENTERAL at 21:45

## 2021-07-24 RX ADMIN — INSULIN HUMAN 2 UNITS: 100 INJECTION, SOLUTION PARENTERAL at 17:23

## 2021-07-24 RX ADMIN — INSULIN HUMAN 3 UNITS: 100 INJECTION, SOLUTION PARENTERAL at 05:36

## 2021-07-24 RX ADMIN — OXYCODONE HYDROCHLORIDE 5 MG: 5 TABLET ORAL at 03:43

## 2021-07-24 RX ADMIN — OXYCODONE HYDROCHLORIDE 10 MG: 10 TABLET ORAL at 15:23

## 2021-07-24 RX ADMIN — LINEZOLID 600 MG: 600 TABLET, FILM COATED ORAL at 17:20

## 2021-07-24 RX ADMIN — DOCUSATE SODIUM 50 MG AND SENNOSIDES 8.6 MG 2 TABLET: 8.6; 5 TABLET, FILM COATED ORAL at 05:28

## 2021-07-24 ASSESSMENT — PAIN DESCRIPTION - PAIN TYPE
TYPE: ACUTE PAIN
TYPE: SURGICAL PAIN
TYPE: ACUTE PAIN
TYPE: SURGICAL PAIN
TYPE: SURGICAL PAIN

## 2021-07-24 ASSESSMENT — ENCOUNTER SYMPTOMS
CHILLS: 0
SHORTNESS OF BREATH: 0
COUGH: 0
VOMITING: 0
ABDOMINAL PAIN: 0
DIAPHORESIS: 0
FEVER: 0
NAUSEA: 0

## 2021-07-24 ASSESSMENT — GAIT ASSESSMENTS
ASSISTIVE DEVICE: FRONT WHEEL WALKER
DISTANCE (FEET): 75
GAIT LEVEL OF ASSIST: SUPERVISED

## 2021-07-24 NOTE — PROGRESS NOTES
Received report from night shift. Patient reporting severe pain despite medications and ice given. New orders received. Patient medicated per MAR, more ice given. Patient able to plantar flex and dorsiflex. Pedal pulses palpable. Bed in lowest, locked position. Patient educated to call prior to ambulation. POC discussed with patient. Call light and belongings in reach. Patient denies further needs at this time.

## 2021-07-24 NOTE — OP REPORT
OPERATIVE NOTE     DATE OF PROCEDURE: 7/23/2021           PRE-OP DIAGNOSIS:  1.  Left knee prepatellar abscess           POST-OP DIAGNOSIS: same           PROCEDURE:  1.  Left knee prepatellar abscess irrigation debridement  2.  Left knee joint aspiration           SURGEON: Ahsan Lara M.D. - Primary           ASSISTANT: None    ANESTHESIA: General endotracheal anesthesia           ESTIMATED BLOOD LOSS: 10 cc                  SPECIMENS: Wound culture x1           COMPLICATIONS: None           CONDITION: Stable           OPERATIVE INDICATIONS AND DESCRIPTION OF PROCEDURE:     I discussed nature diagnosis.  Went over options with him.  He has essentially failed antibiotic treatment at this point.  He is still having ongoing pain difficulty with activities.  I did discuss ongoing nonoperative conservative measures.  I also discussed surgery specifically abscess decompression.  After going over risks and benefits of each patient elected proceed with surgery.  I discussed multiple risks of surgery including ongoing infection chronic wound healing problems chronic pain worsening infection nonhealing wounds arthrofibrosis as well as prolonged antibiotic therapy need for revision surgery Cardiopulmonary risk related anesthesia including heart attack pulmonary embolism and death.  After going over these benefits patient elected proceed.  This point informed consent sheet was signed surgical marking pen was used to place my initials the patient's left knee       Patient was brought back to the operating room.  They were placed supine on a regular table all bony prominences padded very well to prevent neuropraxia's.  They were secured to the table with a strap.  General anesthesia was introduced.  Small hip bump and bone foam were used for for positioning.  At this point a tourniquet was placed and then the left lower extremity was prepped in sterile standard fashion.  At this point a timeout was performed with all  parties in the room ceasing activity. Informed consent sheet was visible confirming the patient's name date of birth MRN and matched their wristband. Antibiotics were confirmed and the left knee/lower extremity was confirmed as the correct surgical site.  My surgical initials were visible on this extremity.  At this point we were cleared to proceed with the procedure.    At this point the localized area of abscess was easily palpable.  I made a 2 cm incision just lateral to midline at the level of the mid patella..  Sharp dissection down through skin and bluntly entered the abscess space.  Immediately purulent fluid was encountered.  At this point we took a sterile wound swab/culture and with this was sent off on the back table to send to the laboratory for analysis.  Next all of the abscess was decompressed with suction evacuation.  Then gross purulent tissue was removed with combination of rongeur as well as pickups.  Next once we review removed the purulent tissue we then copiously irrigated the wound with 3 L normal saline fluid.  At this point we then inspected the abscess area and we were not able to appreciate any other purulent areas.  I used my finger to appreciate any tracking and there was none.  However there was a significant prepatellar space creating dead space I was concerned about.  Because of this I decided to place packing 2 strips within the wound with tails exposed.  Next the wound was loosely approximated with the tails remaining exposed.  At this point I then used a syringe with 18-gauge needle to enter the joint space and aspirate synovial fluid.  I went through the medial proximal aspect of the knee as there was no significant cellulitis or infection in this area that I could appreciate.  Once I entered the space aspirated 10 cc of clear straw-colored fluid from the knee joint itself.  There was not any purulent tissue and clinically it did not appear to be infected.  Because of this I  decided not to pursue formal knee joint irrigation debridement.  This was sent to laboratory for analysis as well    This essentially concluded our case.  We play sterile bandages over the wound with an Ace wrap.  Counts were correct x2.  Patient was woken up from general anesthesia taken PACU in stable condition    POSTOPERATIVE PLAN:  Weight-bearing: Weightbearing as tolerated  DVT prophylaxis: None  Antibiotics: IV ongoing as guided by pending cultures and hospitalist/ID service.  He will need outpatient antibiotics  Readmit to floor  Daily dressing change remove packing 24 hours postop  Patient will need to follow-up with me in 1 week time.  Please call [phone] for appointment

## 2021-07-24 NOTE — PROGRESS NOTES
"Orthopaedic Progress Note  POD#1 s/p left knee prepatellar abscess irrigation debridement, aspiration of left knee joint     Patient seen and examined  Reports ongoing pain however knee is much less sore than yesterday  Denies numbness tingling distally  No fu catheter, voiding well   Denies fevers, chills, chest pain     Recent Labs     07/22/21  0417 07/23/21  0415   WBC 7.3 6.9   RBC 3.92* 3.78*   HEMOGLOBIN 12.4* 11.8*   HEMATOCRIT 34.3* 33.0*   MCV 87.5 87.3   MCH 31.6 31.2   MCHC 36.2* 35.8*   RDW 39.0 38.4   PLATELETCT 166 169   MPV 11.0 10.6       Examination:   /69   Pulse 85   Temp 36.9 °C (98.4 °F) (Temporal)   Resp 16   Ht 1.68 m (5' 6.14\")   Wt 89.3 kg (196 lb 13.9 oz)   SpO2 99%     No acute distress  Breathing unlabored  Dressing clean dry and intact   Dressings removed, packing pulled strips x2  Gentle palpation of the knee does not cause any severe tenderness, redness has decreased compared to yesterday  Motor: fires ehl, tibialis anterior, gastrocnemius  Sensation intact to light touch sural, saphenous, superficial peroneal, deep peroneal, tibial distributions  Compartments soft/compressible  Toes warm and well perfused, cap refill <2      A/P: POD#1 s/p left knee prepatellar abscess irrigation debridement, knee aspiration    Synovial cell count of the left knee aspirate demonstrates less than 200 white cells.  Clinically he does not have a septic knee.  We did decompress the abscess in his knee clinically does seem better despite his ongoing pain.  I recommend following up on cultures for guided antibiotic therapy.  Patient should remain in the hospital for at least 24 more hours of IV antibiotics and then plan on discharge on oral antibiotics which do cover MRSA.  He can be weightbearing as tolerated.  He can follow-up with me in 1 week time please call [phone] for appointment  "

## 2021-07-24 NOTE — OR NURSING
1910: Received pt from OR. 6lpm oxygen via mask. Somnolent but arousable Respirations spontaneous and unlabored. Lung sounds clear on auscultation. Surgical dressing CDI, no drainage noted. Elevate with pillow ice pack applied.  Capillary refill less than 3 seconds. Pedal pulses 2+ equal bilaterally. Per OR nurse LLE surgical dressing with iodoform packing.  1915: Clarified with Dr Nguyen re his order for IV Ofirmev. - NO IV Ofirmev, but may give oral; tylenol if needed  1925: medicated for 8/10 pain in the left knee. Denies nausea. Stable on room air. Tolerating sips of water.  1940: drowsy, arousable. Reports pain 5/10 and tolerable. No nausea  1950: Called Dr Nguyen via phone. BP elevated. TORB for IV labetalol prn  2005: BP remains elevated. Treat per mar  2020: BP better. Meets criteria to transfer back to room.   44897: Discharge from PACU in stable condition. O2 at 2lpm via  oxymask. Transport via bed with more than half full oxygen tank. Side rails up. CMS intact Surgical site dressing remains CDI. Pain controlled and tolerable. Denies nausea. DELMY.  Hand off given to KATY Franklin

## 2021-07-24 NOTE — ANESTHESIA PROCEDURE NOTES
Airway    Date/Time: 7/23/2021 6:30 PM  Performed by: Jarrod Nguyen M.D.  Authorized by: Jarrod Nguyen M.D.     Location:  OR  Urgency:  Elective  Indications for Airway Management:  Anesthesia      Spontaneous Ventilation: absent    Sedation Level:  Deep  Preoxygenated: Yes    Mask Difficulty Assessment:  1 - vent by mask  Final Airway Type:  Supraglottic airway  Final Supraglottic Airway:  Standard LMA    SGA Size:  5  Number of Attempts at Approach:  1

## 2021-07-24 NOTE — PROGRESS NOTES
[facility] Medicine Daily Progress Note    Date of Service  7/24/2021    Chief Complaint  Cristiano Soto is a 40 y.o. male admitted 7/20/2021 with knee pain    Hospital Course  Cristiano Soto is a 40 y.o. male who presents for evaluation of left knee infection.  It started 3 days ago, popping a pimple on the skin anterior to his left knee.  Ultrasound yesterday in ED revealed evidence of septic bursitis.  Patient has left anterior knee pain, no overt fever.  The area of redness is approximately tripled in has had a more difficult time bending at the left knee.  Denies trauma, recent procedures or aspirations.  No fevers or chills, endorses fatigue.     Patient is diabetic.    Previous A1c 9%, no dizziness.  No chest pain, no vomiting or diarrhea    Interval Problem Update  7/21 - Pt with BSs in the 200s, states he only takes Metformin at home. Denies fevers or chills, is stable here. Discussed with Ortho - will obtain MRI knee as the whole knee is red and swollen and would not like to risk seeding to tap the joint.  Continue Unasyn - pt denies a history of MRSA, has no indwelling hardware.     7/22 - Erythema has spread some, increased pain per patient - given the worsening while on Unasyn and DM status, will add MRSA coverage by changing to Zyvox. [facility] to evaluate pt today. No fevers, BSs a little high.     7/23 - Pt still without improvement, going to the OR this morning.  Discussed his DM with him - has been on Metformin + Glipizide in the past but developed hypoglycemia. Has diarrhea with 1000mg BID Metformin, but is amenable to trying 500mg at night along with his 1000mg qam. Afebrile, no white count, having difficulty ambulating due to knee pain.    7/24 - Patient status post L knee prepatellar abscess irrigation and debridement - pain is currently not well controlled, will increase the dosage of his narcotics. Some elevation in Bps are likely related to pain, but pt also  diabetic and should be on an ACEI - will start low dose Lisinopril. Afebrile, BSs better controlled. Culture data pending.    I have personally seen and examined the patient at bedside. I discussed the plan of care with patient.    Consultants/Specialty  orthopedics and n/a       Code Status  Full Code    Disposition  Patient is medically cleared pending culture results .   Anticipate discharge to to home with close outpatient follow-up.  I have placed the appropriate orders for post-discharge needs.    Review of Systems  Review of Systems   Constitutional: Negative for chills, diaphoresis and fever.   Respiratory: Negative for cough and shortness of breath.    Cardiovascular: Negative for chest pain and leg swelling.   Gastrointestinal: Negative for abdominal pain, nausea and vomiting.   Musculoskeletal: Positive for joint pain.   Skin: Positive for rash.   All other systems reviewed and are negative.       Physical Exam  Temp:  [36.5 °C (97.7 °F)-37.2 °C (99 °F)] 36.9 °C (98.4 °F)  Pulse:  [82-97] 88  Resp:  [12-18] 16  BP: (104-179)/(56-99) 153/74  SpO2:  [88 %-99 %] 94 %    Physical Exam  Vitals and nursing note reviewed.   Constitutional:       Appearance: Normal appearance. He is not ill-appearing or toxic-appearing.   HENT:      Mouth/Throat:      Mouth: Mucous membranes are moist.   Cardiovascular:      Rate and Rhythm: Normal rate and regular rhythm.      Heart sounds: No murmur heard.     Pulmonary:      Effort: Pulmonary effort is normal. No respiratory distress.      Breath sounds: Normal breath sounds. No wheezing, rhonchi or rales.   Abdominal:      General: Abdomen is flat. Bowel sounds are normal. There is no distension.      Palpations: Abdomen is soft.      Tenderness: There is no abdominal tenderness. There is no guarding.   Skin:     General: Skin is warm.      Comments: Erythema to L knee improved, wound packing in place, no discharge from wound.    Neurological:      General: No focal deficit  present.      Mental Status: He is alert and oriented to person, place, and time.         Fluids    Intake/Output Summary (Last 24 hours) at 7/24/2021 0710  Last data filed at 7/24/2021 0343  Gross per 24 hour   Intake 785 ml   Output 600 ml   Net 185 ml       Laboratory  Recent Labs     07/22/21 0417 07/23/21 0415   WBC 7.3 6.9   RBC 3.92* 3.78*   HEMOGLOBIN 12.4* 11.8*   HEMATOCRIT 34.3* 33.0*   MCV 87.5 87.3   MCH 31.6 31.2   MCHC 36.2* 35.8*   RDW 39.0 38.4   PLATELETCT 166 169   MPV 11.0 10.6     Recent Labs     07/22/21 0417 07/23/21 0415   SODIUM 134* 136   POTASSIUM 3.9 3.6   CHLORIDE 101 101   CO2 25 25   GLUCOSE 186* 147*   BUN 9 10   CREATININE 0.66 0.65   CALCIUM 8.4 8.4                   Imaging  MR-KNEE-WITH & W/O LEFT   Final Result      1.  No evidence of osteomyelitis.      2.  Extensive cellulitis with a subcutaneous rim-enhancing fluid collection anterior and lateral to the patella measuring 2.4 x 2.3 x 0.7 cm in size possibly representing subcutaneous abscess.      3.  Intact ligaments and menisci.      4.  Moderate sized joint effusion.      DX-KNEE COMPLETE 4+ LEFT   Final Result      1.  No evidence of fracture or bony destructive change.      2.  Soft tissue swelling.           Assessment/Plan  * Abscess of prepatellar region  Assessment & Plan  -  Continue Zyvox pending culture data from I&D on 7/23  - Home when pain controlled and cultures result  - Appreciate Dr Lara       Hyponatremia  Assessment & Plan  - Corrects for glucose   - RESOLVED     Hypertriglyceridemia  Assessment & Plan  - Despite having hypertriglyceridemia, LDL is at goal - per guidelines, will discuss implementation with lifestyle modifications as initial intervention and PCP follow up as his levels continue to decline from 2019    Type 2 diabetes mellitus, without long-term current use of insulin (HCC)  Assessment & Plan  - A1c 9.2, pt states he takes Metformin only at home  - Discussed his DM with him - has  been on Metformin + Glipizide in the past but developed hypoglycemia. Has diarrhea with 1000mg BID Metformin, but is amenable to trying 500mg at night along with his 1000mg qam. Will start this regimen along with insulin while in hospital today   - Increased to 10u Lantus at night with satisfactory effect       VTE prophylaxis: enoxaparin ppx    I have performed a physical exam and reviewed and updated ROS and Plan today (7/24/2021). In review of yesterday's note (7/23/2021), there are no changes except as documented above.

## 2021-07-24 NOTE — PROGRESS NOTES
Orthopedic progress note    Patient reports ongoing very anterior and superficial knee pain of the left knee.  Denies any pain in his hip or ankle.  Denies numbness tingling distally just complains of swelling redness warmth and pain in the anterior aspect of the knee.  Denies constitutional symptoms including chest pain shortness of breath fever chills night sweats unintentional weight loss      To recap  Cristiano Soto is a 40 y.o. male who presents after 1 week of left knee pain.  He indicates that he spontaneously developed a small abscess in the knee that they popped.  Got better for a few days and then 3 to 4 days ago seemingly the knee got more worse red swollen and warm to the touch.  It is bothering him now just on the anterior aspect of the knee.  He is denied systematic issues include night sweats fever chills unintentional weight loss.  He is a poorly controlled diabetic otherwise no medical history.  Independent ambulator able to put perform all activities of daily living.    Past Medical History:   Diagnosis Date   • DM (diabetes mellitus) (HCC)    • Other general symptoms(780.99)     hemorrhoids   • Septic bursitis        History reviewed. No pertinent surgical history.    Medications  No current facility-administered medications on file prior to encounter.     Current Outpatient Medications on File Prior to Encounter   Medication Sig Dispense Refill   • amoxicillin (AMOXIL) 875 MG tablet Take 875 mg by mouth 2 times a day. Pt started on 7/19/2021 for 10 day course     • sulfamethoxazole-trimethoprim (BACTRIM DS) 800-160 MG tablet Take 1 tablet by mouth 2 times a day. Pt started on 7/19/2021 for 10 day course     • ibuprofen (MOTRIN) 600 MG Tab Take 600 mg by mouth every 6 hours. Pt started on 7/19/2021     • acetaminophen (TYLENOL) 325 MG Tab Take 650 mg by mouth every four hours as needed for Moderate Pain.     • metformin (GLUCOPHAGE) 1000 MG tablet Take 1,000 mg by mouth every day.    "      Allergies  Nkda [no known drug allergy]    ROS  All other systems were reviewed and found to be negative    History reviewed. No pertinent family history.    Social History     Socioeconomic History   • Marital status:      Spouse name: Not on file   • Number of children: Not on file   • Years of education: Not on file   • Highest education level: Not on file   Occupational History   • Not on file   Tobacco Use   • Smoking status: Former Smoker   • Smokeless tobacco: Never Used   Substance and Sexual Activity   • Alcohol use: Not Currently     Comment: occasionally   • Drug use: No   • Sexual activity: Not on file   Other Topics Concern   • Not on file   Social History Narrative   • Not on file     Social Determinants of Health     Financial Resource Strain:    • Difficulty of Paying Living Expenses:    Food Insecurity:    • Worried About Running Out of Food in the Last Year:    • Ran Out of Food in the Last Year:    Transportation Needs:    • Lack of Transportation (Medical):    • Lack of Transportation (Non-Medical):    Physical Activity:    • Days of Exercise per Week:    • Minutes of Exercise per Session:    Stress:    • Feeling of Stress :    Social Connections:    • Frequency of Communication with Friends and Family:    • Frequency of Social Gatherings with Friends and Family:    • Attends [employer] Services:    • Active Member of Clubs or Organizations:    • Attends Club or Organization Meetings:    • Marital Status:    Intimate Partner Violence:    • Fear of Current or Ex-Partner:    • Emotionally Abused:    • Physically Abused:    • Sexually Abused:        Physical Exam  Vitals  BP (!) 179/94   Pulse 89   Temp 36.7 °C (98.1 °F) (Temporal)   Resp 18   Ht 1.68 m (5' 6.14\")   Wt 89.3 kg (196 lb 13.9 oz)   SpO2 97%   General: No acute distress alert and oriented x3.  Cooperative breathing room air conversing appropriately  Skin: Intact, no open wounds unless otherwise noted " below  Extremities:  Bilateral upper right lower extremity: Skin is atraumatic.  No tenderness palpation in any region of the limbs.  Skin is intact painless range of motion joints.  Grossly neurovascular intact    Left lower extremity:  Patient does have a area of erythema over the anterior knee.  Otherwise patient has largely nonerythematous knee.  Short arc motion does not cause pain he just feels tightness in the anterior aspect of the knee with pain.  It is significantly tender in this area localized to the anterior knee.  No tenderness palpation along joint lines or palpation of the knee posteriorly.  Calf soft supple negative Davis.  Able to fire EHL FHL gastrocsoleus complex and tib ant.  Sensation to touch intact light touch superficial and deep peroneal, tibial, sural saphenous nerves.  Palpable DP pulse foot is warm well perfused    Radiographs:  MR-KNEE-WITH & W/O LEFT   Final Result      1.  No evidence of osteomyelitis.      2.  Extensive cellulitis with a subcutaneous rim-enhancing fluid collection anterior and lateral to the patella measuring 2.4 x 2.3 x 0.7 cm in size possibly representing subcutaneous abscess.      3.  Intact ligaments and menisci.      4.  Moderate sized joint effusion.      DX-KNEE COMPLETE 4+ LEFT   Final Result      1.  No evidence of fracture or bony destructive change.      2.  Soft tissue swelling.          Laboratory Values  Recent Labs     07/21/21  0403 07/22/21 0417 07/23/21 0415   WBC 7.4 7.3 6.9   RBC 4.01* 3.92* 3.78*   HEMOGLOBIN 12.5* 12.4* 11.8*   HEMATOCRIT 35.6* 34.3* 33.0*   MCV 88.8 87.5 87.3   MCH 31.2 31.6 31.2   MCHC 35.1 36.2* 35.8*   RDW 39.2 39.0 38.4   PLATELETCT 150* 166 169   MPV 11.4 11.0 10.6     Recent Labs     07/21/21  0403 07/22/21  0417 07/23/21 0415   SODIUM 134* 134* 136   POTASSIUM 3.6 3.9 3.6   CHLORIDE 101 101 101   CO2 21 25 25   GLUCOSE 242* 186* 147*   BUN 12 9 10         Imaging:  Imaging does demonstrate prepatellar abscess  approximately 2 x 2 x 1 cm.  No evidence of septic joint    Impression:  Left knee prepatellar abscess    Plan: Discussed nature diagnosis.  Went over options with him.  He has essentially failed antibiotic treatment at this point.  He is still having ongoing pain difficulty with activities.  I did discuss ongoing nonoperative conservative measures.  I also discussed surgery specifically abscess decompression.  After going over risks and benefits of each patient elected proceed with surgery.  I discussed multiple risks of surgery including ongoing infection chronic wound healing problems chronic pain worsening infection nonhealing wounds arthrofibrosis as well as prolonged antibiotic therapy need for revision surgery Cardiopulmonary risk related anesthesia including heart attack pulmonary embolism and death.  After going over some benefits patient elected proceed    Plan  -Irrigation debridement left knee abscess, possible irrigation debridement left knee joint arthroscopically

## 2021-07-24 NOTE — ANESTHESIA PREPROCEDURE EVALUATION
Relevant Problems   ENDO   (positive) Type 2 diabetes mellitus, without long-term current use of insulin (HCC)       Physical Exam    Airway   Mallampati: II  TM distance: >3 FB  Neck ROM: full       Cardiovascular - normal exam  Rhythm: regular  Rate: normal  (-) murmur     Dental - normal exam           Pulmonary - normal exam  Breath sounds clear to auscultation     Abdominal    Neurological - normal exam                 Anesthesia Plan    ASA 3- EMERGENT       Plan - general       Airway plan will be LMA                    Informed Consent:        joint infection

## 2021-07-24 NOTE — ANESTHESIA POSTPROCEDURE EVALUATION
Patient: Cristiano Soto    Procedure Summary     Date: 07/23/21 Room / Location:  OR 02 / SURGERY [facility]    Anesthesia Start: 1823 Anesthesia Stop: 1910    Procedure: INCISION AND DRAINAGE, WOUND, BY ORTHOPEDICS, KNEE (Left Knee) Diagnosis:     Surgeons: Ahsan Lara M.D. Responsible Provider: Jarrod Nguyen M.D.    Anesthesia Type: general ASA Status: 3 - Emergent          Final Anesthesia Type: general  Last vitals  BP   Blood Pressure: (!) 179/94    Temp   36.7 °C (98.1 °F)    Pulse   89   Resp   18    SpO2   97 %      Anesthesia Post Evaluation    Patient location during evaluation: PACU  Patient participation: complete - patient participated  Level of consciousness: awake and alert  Pain score: 0    Airway patency: patent  Anesthetic complications: no  Cardiovascular status: hemodynamically stable  Respiratory status: acceptable  Hydration status: euvolemic    PONV: none          No complications documented.     Nurse Pain Score: 6 (NPRS)

## 2021-07-24 NOTE — ANESTHESIA TIME REPORT
Anesthesia Start and Stop Event Times     Date Time Event    7/23/2021 1821 Ready for Procedure     1823 Anesthesia Start     1910 Anesthesia Stop        Responsible Staff  07/23/21    Name Role Begin End    Jarrod Nguyen M.D. Anesth 1823 1910        Preop Diagnosis (Free Text):  Pre-op Diagnosis             Preop Diagnosis (Codes):    Post op Diagnosis  Septic prepatellar bursitis of left knee      Premium Reason  A. 3PM - 7AM    Comments:

## 2021-07-24 NOTE — THERAPY
Physical Therapy   Daily Treatment     Patient Name: Cristiano Soto  Age:  40 y.o., Sex:  male  Medical Record #: [id number]  Today's Date: 7/24/2021     Precautions: Fall Risk     07/24/21 1105   Gait Analysis   Gait Level Of Assist Supervised   Assistive Device Front Wheel Walker   Distance (Feet) 75   # of Times Distance was Traveled 2   # of Stairs Climbed 3   Level of Assist with Stairs Modified Independent   Weight Bearing Status FWB   Comments pt tolerates PT well today . pt 's pain is much better controlled today.    Bed Mobility    Supine to Sit Independent   Sit to Supine Independent   Scooting Independent   Rolling Independent   Comments bed flat. / wife present during tx today   Functional Mobility   Sit to Stand Supervised   Bed, Chair, Wheelchair Transfer Supervised   Transfer Method Stand Step   Mobility EOB, sit<>std, ambulation , supine ingym exercises, stairs, gait , sit in bedside chair.    Skilled Intervention Verbal Cuing   Comments pt doing well with mobility today and with FWW   Patient / Family Goals    Patient / Family Goal #1 to go home   Short Term Goals    Short Term Goal # 1 pt will ambulate for 150ft w/fww w/spv in 6tx for safe d/c home    Goal Outcome # 1 Goal met   Goal Outcome # 2 Goal met   Anticipated Discharge Equipment and Recommendations   DC Equipment Recommendations Front-Wheel Walker   Discharge Recommendations Recommend home health for continued physical therapy services     Assessment  Pt is a 39 y/o who is on antibiotics for a patellar absess.   Pt has expected stiffness in the L knee, L LE  pain with activity and L LE strength deficits  ( quad). Pt has decreased balance but demonstrates improved activity tolerance with gait to the PT gym and stair training /there exercise and then gait to return to his room . Pt is safe with ambulation and has a good knowledge of There ex's and will work on them as well as on AAROM of the L knee. Pt has met all goals and is  ready for an IHEP.       Plan    Discharge secondary to goals met.    DC Equipment Recommendations: Front-Wheel Walker  Discharge Recommendations: Recommend home health for continued physical therapy services

## 2021-07-24 NOTE — PROGRESS NOTES
Pt arrived on unit, A&Ox4, VSS, denies pain or nausea. Surgical dressing c/d/i, CMS intact. Safety measures and rounding in place.

## 2021-07-24 NOTE — CARE PLAN
The patient is Stable - Low risk of patient condition declining or worsening    Shift Goals  Clinical Goals: Pain management  Patient Goals: rest comfortably     Progress made toward(s) clinical / shift goals:  Pt was able to sleep comfortably throughout the night     Problem: Pain - Standard  Goal: Alleviation of pain or a reduction in pain to the patient’s comfort goal  Outcome: Progressing     Problem: Knowledge Deficit - Standard  Goal: Patient and family/care givers will demonstrate understanding of plan of care, disease process/condition, diagnostic tests and medications  Outcome: Progressing       Patient is not progressing towards the following goals:

## 2021-07-25 PROBLEM — I10 ESSENTIAL HYPERTENSION: Status: ACTIVE | Noted: 2021-07-25

## 2021-07-25 LAB
ANION GAP SERPL CALC-SCNC: 12 MMOL/L (ref 7–16)
BACTERIA BLD CULT: NORMAL
BACTERIA BLD CULT: NORMAL
BASOPHILS # BLD AUTO: 0.4 % (ref 0–1.8)
BASOPHILS # BLD: 0.02 K/UL (ref 0–0.12)
BUN SERPL-MCNC: 12 MG/DL (ref 8–22)
CALCIUM SERPL-MCNC: 8.5 MG/DL (ref 8.4–10.2)
CHLORIDE SERPL-SCNC: 98 MMOL/L (ref 96–112)
CO2 SERPL-SCNC: 23 MMOL/L (ref 20–33)
CREAT SERPL-MCNC: 0.66 MG/DL (ref 0.5–1.4)
EOSINOPHIL # BLD AUTO: 0.09 K/UL (ref 0–0.51)
EOSINOPHIL NFR BLD: 1.6 % (ref 0–6.9)
ERYTHROCYTE [DISTWIDTH] IN BLOOD BY AUTOMATED COUNT: 37.7 FL (ref 35.9–50)
GLUCOSE BLD-MCNC: 140 MG/DL (ref 65–99)
GLUCOSE BLD-MCNC: 144 MG/DL (ref 65–99)
GLUCOSE BLD-MCNC: 157 MG/DL (ref 65–99)
GLUCOSE BLD-MCNC: 166 MG/DL (ref 65–99)
GLUCOSE BLD-MCNC: 171 MG/DL (ref 65–99)
GLUCOSE SERPL-MCNC: 155 MG/DL (ref 65–99)
GRAM STN SPEC: NORMAL
HCT VFR BLD AUTO: 30.9 % (ref 42–52)
HGB BLD-MCNC: 10.9 G/DL (ref 14–18)
IMM GRANULOCYTES # BLD AUTO: 0.03 K/UL (ref 0–0.11)
IMM GRANULOCYTES NFR BLD AUTO: 0.5 % (ref 0–0.9)
LYMPHOCYTES # BLD AUTO: 1.02 K/UL (ref 1–4.8)
LYMPHOCYTES NFR BLD: 18.4 % (ref 22–41)
MCH RBC QN AUTO: 31.4 PG (ref 27–33)
MCHC RBC AUTO-ENTMCNC: 35.3 G/DL (ref 33.7–35.3)
MCV RBC AUTO: 89 FL (ref 81.4–97.8)
MONOCYTES # BLD AUTO: 0.5 K/UL (ref 0–0.85)
MONOCYTES NFR BLD AUTO: 9 % (ref 0–13.4)
NEUTROPHILS # BLD AUTO: 3.87 K/UL (ref 1.82–7.42)
NEUTROPHILS NFR BLD: 70.1 % (ref 44–72)
NRBC # BLD AUTO: 0 K/UL
NRBC BLD-RTO: 0 /100 WBC
PLATELET # BLD AUTO: 162 K/UL (ref 164–446)
PMV BLD AUTO: 10.3 FL (ref 9–12.9)
POTASSIUM SERPL-SCNC: 4.2 MMOL/L (ref 3.6–5.5)
RBC # BLD AUTO: 3.47 M/UL (ref 4.7–6.1)
SIGNIFICANT IND 70042: NORMAL
SITE SITE: NORMAL
SODIUM SERPL-SCNC: 133 MMOL/L (ref 135–145)
SOURCE SOURCE: NORMAL
WBC # BLD AUTO: 5.5 K/UL (ref 4.8–10.8)

## 2021-07-25 PROCEDURE — 82962 GLUCOSE BLOOD TEST: CPT

## 2021-07-25 PROCEDURE — 85025 COMPLETE CBC W/AUTO DIFF WBC: CPT

## 2021-07-25 PROCEDURE — 80048 BASIC METABOLIC PNL TOTAL CA: CPT

## 2021-07-25 PROCEDURE — 36415 COLL VENOUS BLD VENIPUNCTURE: CPT

## 2021-07-25 PROCEDURE — 700111 HCHG RX REV CODE 636 W/ 250 OVERRIDE (IP): Performed by: HOSPITALIST

## 2021-07-25 PROCEDURE — 770006 HCHG ROOM/CARE - MED/SURG/GYN SEMI*

## 2021-07-25 PROCEDURE — 99233 SBSQ HOSP IP/OBS HIGH 50: CPT | Performed by: FAMILY MEDICINE

## 2021-07-25 PROCEDURE — A9270 NON-COVERED ITEM OR SERVICE: HCPCS | Performed by: FAMILY MEDICINE

## 2021-07-25 PROCEDURE — 94760 N-INVAS EAR/PLS OXIMETRY 1: CPT

## 2021-07-25 PROCEDURE — 700102 HCHG RX REV CODE 250 W/ 637 OVERRIDE(OP): Performed by: FAMILY MEDICINE

## 2021-07-25 RX ADMIN — INSULIN HUMAN 2 UNITS: 100 INJECTION, SOLUTION PARENTERAL at 07:07

## 2021-07-25 RX ADMIN — METFORMIN HYDROCHLORIDE 1000 MG: 500 TABLET ORAL at 07:40

## 2021-07-25 RX ADMIN — OXYCODONE HYDROCHLORIDE 10 MG: 10 TABLET ORAL at 05:39

## 2021-07-25 RX ADMIN — OXYCODONE HYDROCHLORIDE 5 MG: 5 TABLET ORAL at 20:21

## 2021-07-25 RX ADMIN — OXYCODONE HYDROCHLORIDE 10 MG: 10 TABLET ORAL at 08:54

## 2021-07-25 RX ADMIN — OXYCODONE HYDROCHLORIDE 10 MG: 10 TABLET ORAL at 15:24

## 2021-07-25 RX ADMIN — LISINOPRIL 5 MG: 5 TABLET ORAL at 05:41

## 2021-07-25 RX ADMIN — INSULIN HUMAN 2 UNITS: 100 INJECTION, SOLUTION PARENTERAL at 17:50

## 2021-07-25 RX ADMIN — METFORMIN HYDROCHLORIDE 500 MG: 500 TABLET, EXTENDED RELEASE ORAL at 17:51

## 2021-07-25 RX ADMIN — LINEZOLID 600 MG: 600 TABLET, FILM COATED ORAL at 17:51

## 2021-07-25 RX ADMIN — ENOXAPARIN SODIUM 40 MG: 40 INJECTION SUBCUTANEOUS at 05:40

## 2021-07-25 RX ADMIN — LINEZOLID 600 MG: 600 TABLET, FILM COATED ORAL at 05:39

## 2021-07-25 ASSESSMENT — ENCOUNTER SYMPTOMS
ABDOMINAL PAIN: 0
FEVER: 0
VOMITING: 0
COUGH: 0
DIAPHORESIS: 0
SHORTNESS OF BREATH: 0
CHILLS: 0
NAUSEA: 0

## 2021-07-25 ASSESSMENT — PAIN DESCRIPTION - PAIN TYPE
TYPE: ACUTE PAIN

## 2021-07-25 ASSESSMENT — COGNITIVE AND FUNCTIONAL STATUS - GENERAL
CLIMB 3 TO 5 STEPS WITH RAILING: A LITTLE
SUGGESTED CMS G CODE MODIFIER DAILY ACTIVITY: CH
STANDING UP FROM CHAIR USING ARMS: A LITTLE
SUGGESTED CMS G CODE MODIFIER MOBILITY: CJ
DAILY ACTIVITIY SCORE: 24
WALKING IN HOSPITAL ROOM: A LITTLE
MOVING FROM LYING ON BACK TO SITTING ON SIDE OF FLAT BED: A LITTLE
MOBILITY SCORE: 20

## 2021-07-25 NOTE — PROGRESS NOTES
Assumed care of pt at 0715. Received report from Julio César BURNS. Pt stated pain 5 on a 0 to 10 scale. Pt educated on medication availability, pt verbalized understanding. Left knee dressing dry and intact with old serous drainage. Pt requesting to take a shower, provided pt with tools to take one.  No other needs at this time, call light in reach, bed in lowest position.

## 2021-07-25 NOTE — CARE PLAN
The patient is Stable - Low risk of patient condition declining or worsening    Shift Goals  Clinical Goals: Pain management  Patient Goals: Restful sleep throughout the night.    Progress made toward(s) clinical / shift goals:  Oxy 10 given this evening, sleeping restfully in bed.   Patient is not progressing towards the following goals:      Problem: Knowledge Deficit - Standard  Goal: Patient and family/care givers will demonstrate understanding of plan of care, disease process/condition, diagnostic tests and medications  Outcome: Progressing     Problem: Pain - Standard  Goal: Alleviation of pain or a reduction in pain to the patient’s comfort goal  Outcome: Progressing

## 2021-07-25 NOTE — CARE PLAN
The patient is Stable - Low risk of patient condition declining or worsening    Shift Goals  Clinical Goals: pain control  Patient Goals: Restful sleep throughout the night.    Progress made toward(s) clinical / shift goals:  Pt pain managed via medication per MAR. Increased pt comfort with shower.

## 2021-07-25 NOTE — PROGRESS NOTES
[facility] Medicine Daily Progress Note    Date of Service  7/25/2021    Chief Complaint  Cristiano Soto is a 40 y.o. male admitted 7/20/2021 with knee pain    Hospital Course  Cristiano Soto is a 40 y.o. male who presents for evaluation of left knee infection.  It started 3 days ago, popping a pimple on the skin anterior to his left knee.  Ultrasound yesterday in ED revealed evidence of septic bursitis.  Patient has left anterior knee pain, no overt fever.  The area of redness is approximately tripled in has had a more difficult time bending at the left knee.  Denies trauma, recent procedures or aspirations.  No fevers or chills, endorses fatigue.     Patient is diabetic.    Previous A1c 9%, no dizziness.  No chest pain, no vomiting or diarrhea    Interval Problem Update  7/21 - Pt with BSs in the 200s, states he only takes Metformin at home. Denies fevers or chills, is stable here. Discussed with Ortho - will obtain MRI knee as the whole knee is red and swollen and would not like to risk seeding to tap the joint.  Continue Unasyn - pt denies a history of MRSA, has no indwelling hardware.     7/22 - Erythema has spread some, increased pain per patient - given the worsening while on Unasyn and DM status, will add MRSA coverage by changing to Zyvox. [facility] to evaluate pt today. No fevers, BSs a little high.     7/23 - Pt still without improvement, going to the OR this morning.  Discussed his DM with him - has been on Metformin + Glipizide in the past but developed hypoglycemia. Has diarrhea with 1000mg BID Metformin, but is amenable to trying 500mg at night along with his 1000mg qam. Afebrile, no white count, having difficulty ambulating due to knee pain.    7/24 - Patient status post L knee prepatellar abscess irrigation and debridement - pain is currently not well controlled, will increase the dosage of his narcotics. Some elevation in Bps are likely related to pain, but pt also  diabetic and should be on an ACEI - will start low dose Lisinopril. Afebrile, BSs better controlled. Culture data pending.    7/25 - Pt with fever to 100.6 last evening, will monitor until pt is >24 hours afebrile.  Awaiting culture data results - pt states his knee feels improved today, and it does appear less erythematous.      I have personally seen and examined the patient at bedside. I discussed the plan of care with patient.    Consultants/Specialty  orthopedics and n/a       Code Status  Full Code    Disposition  Patient is medically cleared pending culture results .   Anticipate discharge to to home with close outpatient follow-up.  I have placed the appropriate orders for post-discharge needs.    Review of Systems  Review of Systems   Constitutional: Negative for chills, diaphoresis and fever.   Respiratory: Negative for cough and shortness of breath.    Cardiovascular: Negative for chest pain and leg swelling.   Gastrointestinal: Negative for abdominal pain, nausea and vomiting.   Musculoskeletal: Positive for joint pain.   Skin: Positive for rash.   All other systems reviewed and are negative.       Physical Exam  Temp:  [36.4 °C (97.6 °F)-38.1 °C (100.6 °F)] 36.9 °C (98.4 °F)  Pulse:  [] 98  Resp:  [18-20] 20  BP: (131-155)/(68-88) 131/68  SpO2:  [90 %-96 %] 96 %    Physical Exam  Vitals and nursing note reviewed.   Constitutional:       Appearance: Normal appearance. He is not ill-appearing or toxic-appearing.   HENT:      Mouth/Throat:      Mouth: Mucous membranes are moist.   Cardiovascular:      Rate and Rhythm: Normal rate and regular rhythm.      Heart sounds: No murmur heard.     Pulmonary:      Effort: Pulmonary effort is normal. No respiratory distress.      Breath sounds: Normal breath sounds. No wheezing, rhonchi or rales.   Abdominal:      General: Abdomen is flat. Bowel sounds are normal. There is no distension.      Palpations: Abdomen is soft.      Tenderness: There is no abdominal  tenderness. There is no guarding.   Skin:     General: Skin is warm.      Comments: Erythema to L knee improved, wound packing out and stitches in   Neurological:      General: No focal deficit present.      Mental Status: He is alert and oriented to person, place, and time.         Fluids    Intake/Output Summary (Last 24 hours) at 7/25/2021 0836  Last data filed at 7/24/2021 2000  Gross per 24 hour   Intake 960 ml   Output --   Net 960 ml       Laboratory  Recent Labs     07/23/21  0415 07/25/21  0523   WBC 6.9 5.5   RBC 3.78* 3.47*   HEMOGLOBIN 11.8* 10.9*   HEMATOCRIT 33.0* 30.9*   MCV 87.3 89.0   MCH 31.2 31.4   MCHC 35.8* 35.3   RDW 38.4 37.7   PLATELETCT 169 162*   MPV 10.6 10.3     Recent Labs     07/23/21  0415 07/25/21  0352   SODIUM 136 133*   POTASSIUM 3.6 4.2   CHLORIDE 101 98   CO2 25 23   GLUCOSE 147* 155*   BUN 10 12   CREATININE 0.65 0.66   CALCIUM 8.4 8.5                   Imaging  MR-KNEE-WITH & W/O LEFT   Final Result      1.  No evidence of osteomyelitis.      2.  Extensive cellulitis with a subcutaneous rim-enhancing fluid collection anterior and lateral to the patella measuring 2.4 x 2.3 x 0.7 cm in size possibly representing subcutaneous abscess.      3.  Intact ligaments and menisci.      4.  Moderate sized joint effusion.      DX-KNEE COMPLETE 4+ LEFT   Final Result      1.  No evidence of fracture or bony destructive change.      2.  Soft tissue swelling.           Assessment/Plan  * Abscess of prepatellar region  Assessment & Plan  -  Continue Zyvox pending culture data from I&D on 7/23  - Home when pain controlled and cultures result  - Appreciate Dr Lara       Essential hypertension  Assessment & Plan  - Continue Lisinopril, a new Rx for this patient     Hyponatremia  Assessment & Plan  - Corrects for glucose   - RESOLVED     Hypertriglyceridemia  Assessment & Plan  - Despite having hypertriglyceridemia, LDL is at goal - per guidelines, will discuss implementation with lifestyle  modifications as initial intervention and PCP follow up as his levels continue to decline from 2019    Type 2 diabetes mellitus, without long-term current use of insulin ([facility])  Assessment & Plan  - A1c 9.2, pt states he takes Metformin only at home  - Discussed his DM with him - has been on Metformin + Glipizide in the past but developed hypoglycemia. Has diarrhea with 1000mg BID Metformin, but is amenable to trying 500mg at night along with his 1000mg qam. Continue this regimen along with insulin while in hospital   - Increased to 10u Lantus at night with satisfactory effect       VTE prophylaxis: enoxaparin ppx    I have performed a physical exam and reviewed and updated ROS and Plan today (7/25/2021). In review of yesterday's note (7/24/2021), there are no changes except as documented above.

## 2021-07-25 NOTE — CARE PLAN
The patient is Stable - Low risk of patient condition declining or worsening    Shift Goals  Clinical Goals: Pain management  Patient Goals: rest comfortably     Progress made toward(s) clinical / shift goals:    Problem: Pain - Standard  Goal: Alleviation of pain or a reduction in pain to the patient’s comfort goal  Outcome: Progressing  Note: Patient reported uncontrolled pain in AM. Orders adjusted, patient reported more controlled pain. Patient medicated per MAR.      Problem: Mobility  Goal: Patient's capacity to carry out activities will improve  Outcome: Progressing  Flowsheets (Taken 7/24/2021 8697)  Mobility: Encouraged mobilization per interdisciplinary team recommendations  Note: Patient apprehensive about ambulating in AM due to pain. Medicated per MAR. Patient tolerated ambulation.        Patient is not progressing towards the following goals:

## 2021-07-26 VITALS
RESPIRATION RATE: 18 BRPM | HEART RATE: 81 BPM | OXYGEN SATURATION: 97 % | HEIGHT: 66 IN | SYSTOLIC BLOOD PRESSURE: 158 MMHG | DIASTOLIC BLOOD PRESSURE: 89 MMHG | TEMPERATURE: 98 F | BODY MASS INDEX: 31.64 KG/M2 | WEIGHT: 196.87 LBS

## 2021-07-26 LAB
ANION GAP SERPL CALC-SCNC: 14 MMOL/L (ref 7–16)
BACTERIA WND AEROBE CULT: ABNORMAL
BACTERIA WND AEROBE CULT: ABNORMAL
BASOPHILS # BLD AUTO: 0.5 % (ref 0–1.8)
BASOPHILS # BLD: 0.03 K/UL (ref 0–0.12)
BUN SERPL-MCNC: 8 MG/DL (ref 8–22)
CALCIUM SERPL-MCNC: 8.8 MG/DL (ref 8.4–10.2)
CHLORIDE SERPL-SCNC: 102 MMOL/L (ref 96–112)
CO2 SERPL-SCNC: 24 MMOL/L (ref 20–33)
CREAT SERPL-MCNC: 0.69 MG/DL (ref 0.5–1.4)
EOSINOPHIL # BLD AUTO: 0.09 K/UL (ref 0–0.51)
EOSINOPHIL NFR BLD: 1.4 % (ref 0–6.9)
ERYTHROCYTE [DISTWIDTH] IN BLOOD BY AUTOMATED COUNT: 37.2 FL (ref 35.9–50)
GLUCOSE BLD-MCNC: 174 MG/DL (ref 65–99)
GLUCOSE SERPL-MCNC: 185 MG/DL (ref 65–99)
GRAM STN SPEC: ABNORMAL
HCT VFR BLD AUTO: 33 % (ref 42–52)
HGB BLD-MCNC: 11.6 G/DL (ref 14–18)
IMM GRANULOCYTES # BLD AUTO: 0.03 K/UL (ref 0–0.11)
IMM GRANULOCYTES NFR BLD AUTO: 0.5 % (ref 0–0.9)
LYMPHOCYTES # BLD AUTO: 0.81 K/UL (ref 1–4.8)
LYMPHOCYTES NFR BLD: 12.6 % (ref 22–41)
MCH RBC QN AUTO: 31.1 PG (ref 27–33)
MCHC RBC AUTO-ENTMCNC: 35.2 G/DL (ref 33.7–35.3)
MCV RBC AUTO: 88.5 FL (ref 81.4–97.8)
MONOCYTES # BLD AUTO: 0.47 K/UL (ref 0–0.85)
MONOCYTES NFR BLD AUTO: 7.3 % (ref 0–13.4)
NEUTROPHILS # BLD AUTO: 5.02 K/UL (ref 1.82–7.42)
NEUTROPHILS NFR BLD: 77.7 % (ref 44–72)
NRBC # BLD AUTO: 0 K/UL
NRBC BLD-RTO: 0 /100 WBC
PLATELET # BLD AUTO: 212 K/UL (ref 164–446)
PMV BLD AUTO: 10.5 FL (ref 9–12.9)
POTASSIUM SERPL-SCNC: 3.6 MMOL/L (ref 3.6–5.5)
RBC # BLD AUTO: 3.73 M/UL (ref 4.7–6.1)
SIGNIFICANT IND 70042: ABNORMAL
SITE SITE: ABNORMAL
SODIUM SERPL-SCNC: 140 MMOL/L (ref 135–145)
SOURCE SOURCE: ABNORMAL
WBC # BLD AUTO: 6.5 K/UL (ref 4.8–10.8)

## 2021-07-26 PROCEDURE — A9270 NON-COVERED ITEM OR SERVICE: HCPCS | Performed by: FAMILY MEDICINE

## 2021-07-26 PROCEDURE — 36415 COLL VENOUS BLD VENIPUNCTURE: CPT

## 2021-07-26 PROCEDURE — 700111 HCHG RX REV CODE 636 W/ 250 OVERRIDE (IP): Performed by: HOSPITALIST

## 2021-07-26 PROCEDURE — 99239 HOSP IP/OBS DSCHRG MGMT >30: CPT | Performed by: FAMILY MEDICINE

## 2021-07-26 PROCEDURE — 94760 N-INVAS EAR/PLS OXIMETRY 1: CPT

## 2021-07-26 PROCEDURE — 80048 BASIC METABOLIC PNL TOTAL CA: CPT

## 2021-07-26 PROCEDURE — 85025 COMPLETE CBC W/AUTO DIFF WBC: CPT

## 2021-07-26 PROCEDURE — 700102 HCHG RX REV CODE 250 W/ 637 OVERRIDE(OP): Performed by: FAMILY MEDICINE

## 2021-07-26 PROCEDURE — 82962 GLUCOSE BLOOD TEST: CPT

## 2021-07-26 RX ORDER — LISINOPRIL 10 MG/1
10 TABLET ORAL DAILY
Qty: 30 TABLET | Refills: 0 | Status: SHIPPED | OUTPATIENT
Start: 2021-07-26

## 2021-07-26 RX ORDER — DOXYCYCLINE 100 MG/1
100 CAPSULE ORAL 2 TIMES DAILY
Qty: 8 CAPSULE | Refills: 0 | Status: SHIPPED | OUTPATIENT
Start: 2021-07-26 | End: 2021-07-30

## 2021-07-26 RX ORDER — OXYCODONE HYDROCHLORIDE 5 MG/1
5 TABLET ORAL EVERY 6 HOURS PRN
Qty: 15 TABLET | Refills: 0 | Status: SHIPPED | OUTPATIENT
Start: 2021-07-26 | End: 2021-07-30

## 2021-07-26 RX ORDER — CEPHALEXIN 500 MG/1
500 CAPSULE ORAL 4 TIMES DAILY
Qty: 16 CAPSULE | Refills: 0 | Status: SHIPPED | OUTPATIENT
Start: 2021-07-26 | End: 2021-07-30

## 2021-07-26 RX ADMIN — ENOXAPARIN SODIUM 40 MG: 40 INJECTION SUBCUTANEOUS at 06:00

## 2021-07-26 RX ADMIN — LINEZOLID 600 MG: 600 TABLET, FILM COATED ORAL at 05:57

## 2021-07-26 RX ADMIN — LISINOPRIL 5 MG: 5 TABLET ORAL at 05:57

## 2021-07-26 RX ADMIN — OXYCODONE HYDROCHLORIDE 5 MG: 5 TABLET ORAL at 05:57

## 2021-07-26 RX ADMIN — INSULIN HUMAN 2 UNITS: 100 INJECTION, SOLUTION PARENTERAL at 06:07

## 2021-07-26 RX ADMIN — METFORMIN HYDROCHLORIDE 1000 MG: 500 TABLET ORAL at 07:39

## 2021-07-26 ASSESSMENT — PAIN DESCRIPTION - PAIN TYPE
TYPE: ACUTE PAIN
TYPE: ACUTE PAIN

## 2021-07-26 NOTE — DISCHARGE PLANNING
Received Choice form at 0906  Agency/Facility Name: [facility]  Referral sent per Choice form @ [phone]

## 2021-07-26 NOTE — PROGRESS NOTES
Assumed care of pt at 0715. Received report from Brandy BURNS. Pt resting in bed. Pt pain is tolerable at this time. Left knee dressing changed and is CDI. Left knee surgical site intact, closed with sutures. Minor swelling at the left knee. No other needs at this time, call light in reach, bed in lowest position.

## 2021-07-26 NOTE — DISCHARGE INSTRUCTIONS
Discharge Instructions    Discharged to home by car with relative. Discharged via wheelchair, hospital escort: Yes.  Special equipment needed: Walker    Be sure to schedule a follow-up appointment with your primary care doctor or any specialists as instructed.     Discharge Plan:   Diet Plan: Discussed  Activity Level: Discussed  Confirmed Follow up Appointment: Patient to Call and Schedule Appointment  Confirmed Symptoms Management: Discussed  Medication Reconciliation Updated: Yes    I understand that a diet low in cholesterol, fat, and sodium is recommended for good health. Unless I have been given specific instructions below for another diet, I accept this instruction as my diet prescription.   Other diet: diabetic diet    Special Instructions: None    · Is patient discharged on Warfarin / Coumadin?   No     Depression / Suicide Risk    As you are discharged from this [facility] facility, it is important to learn how to keep safe from harming yourself.    Recognize the warning signs:  · Abrupt changes in personality, positive or negative- including increase in energy   · Giving away possessions  · Change in eating patterns- significant weight changes-  positive or negative  · Change in sleeping patterns- unable to sleep or sleeping all the time   · Unwillingness or inability to communicate  · Depression  · Unusual sadness, discouragement and loneliness  · Talk of wanting to die  · Neglect of personal appearance   · Rebelliousness- reckless behavior  · Withdrawal from people/activities they love  · Confusion- inability to concentrate     If you or a loved one observes any of these behaviors or has concerns about self-harm, here's what you can do:  · Talk about it- your feelings and reasons for harming yourself  · Remove any means that you might use to hurt yourself (examples: pills, rope, extension cords, firearm)  · Get professional help from the community (Mental Health, Substance Abuse, psychological  counseling)  · Do not be alone:Call your Safe Contact- someone whom you trust who will be there for you.  · Call your local CRISIS HOTLINE 252-0474 or 231-127-0458  · Call your local Children's Mobile Crisis Response Team [place] (586) 390-8261 or www.[employer]  · Call the toll free National Suicide Prevention Hotlines   · National Suicide Prevention Lifeline 482-677-WWBO (7373)  · National Hope Line Network 800-SUICIDE (708-4750)    Abscess  Care After  An abscess (also called a boil or furuncle) is an infected area that contains a collection of pus. Signs and symptoms of an abscess include pain, tenderness, redness, or hardness, or you may feel a moveable soft area under your skin. An abscess can occur anywhere in the body. The infection may spread to surrounding tissues causing cellulitis. A cut (incision) by the surgeon was made over your abscess and the pus was drained out. Gauze may have been packed into the space to provide a drain that will allow the cavity to heal from the inside outwards. The boil may be painful for 5 to 7 days. Most people with a boil do not have high fevers. Your abscess, if seen early, may not have localized, and may not have been lanced. If not, another appointment may be required for this if it does not get better on its own or with medications.  HOME CARE INSTRUCTIONS   · Only take over-the-counter or prescription medicines for pain, discomfort, or fever as directed by your caregiver.  · When you bathe, soak and then remove gauze or iodoform packs at least daily or as directed by your caregiver. You may then wash the wound gently with mild soapy water. Repack with gauze or do as your caregiver directs.  SEEK IMMEDIATE MEDICAL CARE IF:   · You develop increased pain, swelling, redness, drainage, or bleeding in the wound site.  · You develop signs of generalized infection including muscle aches, chills, fever, or a general ill feeling.  · An oral temperature above 102° F  (38.9° C) develops, not controlled by medication.  See your caregiver for a recheck if you develop any of the symptoms described above. If medications (antibiotics) were prescribed, take them as directed.  Document Released: 07/06/2006 Document Revised: 03/11/2013 Document Reviewed: 03/02/2009    ExitCare® Patient Information ©2014 [employer].      Diet: Diet Order Diet: Consistent CHO (Diabetic)  Activity: As tolerated  Follow Up: PCP in 1-2 weeks and Dr Lara in one week  Disposition:Home  Diagnosis: Abscess of prepatellar region    Follow up with your Primary Care Provider Mohsen Tamasaby, M.D. as scheduled or sooner if your symptoms persist or worsen.  Return to Emergency Room for severe chest pain, shortness of breath, signs of a stroke, or any other emergencies.

## 2021-07-26 NOTE — DISCHARGE SUMMARY
Discharge Summary    CHIEF COMPLAINT ON ADMISSION  Chief Complaint   Patient presents with   • Knee Pain       Reason for Admission  Knee Pain     Admission Date  7/20/2021    CODE STATUS  Full Code    HPI & HOSPITAL COURSE  This is a 40 y.o. male here with a history of DM II and a knee cellulitis with pre-patellar abscess seen on CT.  He was taken to the OR with Dr Lara for I&D, and prelim cultures are demonstrating staph. Given his underlying DM II, will treat with Keflex as well as doxy at discharge. He has been afebrile for >24 hours.  His A1c demonstrates poor control at 9.2% - I discussed with patient; he is only taking his Metformin once daily, we will increase to BID with just 500mg at night due to a reported history of diarrhea with qhs Metformin.  I have advised him to keep a BS log and present it to his PCP in one week. I have also started Lisinopril for renal protection and elevated Bps.        Therefore, he is discharged in good and stable condition to home with close outpatient follow-up.    The patient met 2-midnight criteria for an inpatient stay at the time of discharge.    Discharge Date  7/26/21    FOLLOW UP ITEMS POST DISCHARGE  Knee culture results     DISCHARGE DIAGNOSES  Principal Problem:    Abscess of prepatellar region POA: Unknown  Active Problems:    Type 2 diabetes mellitus, without long-term current use of insulin (HCC) POA: Unknown    Hypertriglyceridemia POA: Unknown    Hyponatremia POA: Unknown    Essential hypertension POA: Unknown  Resolved Problems:    * No resolved hospital problems. *      FOLLOW UP  No future appointments.  Mohsen Tamasaby, M.D.  [address] [address] [phone]    In 1 week      Ahsan Lara M.D.  [address]  [facility] [address]  [facility] [id number] [phone]    In 1 week        MEDICATIONS ON DISCHARGE     Medication List      START taking these medications      Instructions   cephALEXin 500 MG Caps  Commonly  known as: KEFLEX   Take 1 capsule by mouth 4 times a day for 4 days.  Dose: 500 mg     doxycycline 100 MG capsule  Commonly known as: MONODOX   Take 1 capsule by mouth 2 times a day for 4 days.  Dose: 100 mg     lisinopril 10 MG Tabs  Commonly known as: PRINIVIL   Take 1 tablet by mouth every day.  Dose: 10 mg     oxyCODONE immediate-release 5 MG Tabs  Commonly known as: ROXICODONE   Take 1 tablet by mouth every 6 hours as needed for up to 4 days.  Dose: 5 mg        CHANGE how you take these medications      Instructions   * metformin 1000 MG tablet  What changed: Another medication with the same name was added. Make sure you understand how and when to take each.  Commonly known as: GLUCOPHAGE   Take 1,000 mg by mouth every day.  Dose: 1,000 mg     * metFORMIN 500 MG Tabs  What changed: You were already taking a medication with the same name, and this prescription was added. Make sure you understand how and when to take each.  Commonly known as: GLUCOPHAGE   Take 1 tablet by mouth at bedtime.  Dose: 500 mg         * This list has 2 medication(s) that are the same as other medications prescribed for you. Read the directions carefully, and ask your doctor or other care provider to review them with you.            CONTINUE taking these medications      Instructions   acetaminophen 325 MG Tabs  Commonly known as: Tylenol   Take 650 mg by mouth every four hours as needed for Moderate Pain.  Dose: 650 mg     ibuprofen 600 MG Tabs  Commonly known as: MOTRIN   Take 600 mg by mouth every 6 hours. Pt started on 7/19/2021  Dose: 600 mg        STOP taking these medications    amoxicillin 875 MG tablet  Commonly known as: AMOXIL     sulfamethoxazole-trimethoprim 800-160 MG tablet  Commonly known as: BACTRIM DS            Allergies  Allergies   Allergen Reactions   • Nkda [No Known Drug Allergy]        DIET  Orders Placed This Encounter   Procedures   • Diet Order Diet: Consistent CHO (Diabetic)     Standing Status:   Standing      Number of Occurrences:   1     Order Specific Question:   Diet:     Answer:   Consistent CHO (Diabetic) [4]       ACTIVITY  As tolerated.  Weight bearing as tolerated    CONSULTATIONS  Dr Lara     PROCEDURES  L knee pre-patellar abscess I&D with joint aspiration    LABORATORY  Lab Results   Component Value Date    SODIUM 140 07/26/2021    POTASSIUM 3.6 07/26/2021    CHLORIDE 102 07/26/2021    CO2 24 07/26/2021    GLUCOSE 185 (H) 07/26/2021    BUN 8 07/26/2021    CREATININE 0.69 07/26/2021    CREATININE 0.8 03/22/2008        Lab Results   Component Value Date    WBC 6.5 07/26/2021    HEMOGLOBIN 11.6 (L) 07/26/2021    HEMATOCRIT 33.0 (L) 07/26/2021    PLATELETCT 212 07/26/2021        Total time of the discharge process exceeds 33 minutes.

## 2021-07-26 NOTE — CARE PLAN
The patient is Stable - Low risk of patient condition declining or worsening    Shift Goals  Clinical Goals: Pain management  Patient Goals: Patient free from falls.    Progress made toward(s) clinical / shift goals:  Pain managed well throughout shift.  Patient rested well throughout shift.      Patient is not progressing towards the following goals:      Problem: Knowledge Deficit - Standard  Goal: Patient and family/care givers will demonstrate understanding of plan of care, disease process/condition, diagnostic tests and medications  Outcome: Progressing     Problem: Pain - Standard  Goal: Alleviation of pain or a reduction in pain to the patient’s comfort goal  Outcome: Progressing

## 2021-07-26 NOTE — DISCHARGE PLANNING
Anticipated Discharge Disposition: Home with DME- walker     Action: LSW met with pt at bedside to discuss DME CHOICE. Pt provided signature for [facility] for walker to get delivered to bedside.   Charge RNMarisol aware to get walker from the storage.     Dr. Yang aware that pt does not have health insurance. PT able to evaluate pt on 7/24 and recommending HH. Dr. Yang okay with pt d/c without HH.     Barriers to Discharge: None     Plan: LSW to assist as needed     Addendum 0905  LSW faxed DME CHOICE to Sofia [place].

## 2021-07-26 NOTE — PROGRESS NOTES
Discussed discharge instructions with patient. Pt verbalized understanding. IV removed, paperwork signed. Pt brought down to emergency exit via wheelchair with patient transport.

## 2021-07-26 NOTE — FACE TO FACE
Face to Face Note  -  Durable Medical Equipment    Wanda Yang M.D. - NPI: [id number]  I certify that this patient is under my care and that they had a durable medical equipment(DME)face to face encounter by myself that meets the physician DME face-to-face encounter requirements with this patient on:    Date of encounter:   Patient:                    MRN:                       YOB: 2023  Cristiano Soto  [id number]  1980     The encounter with the patient was in whole, or in part, for the following medical condition, which is the primary reason for durable medical equipment:  Other - Pre-patellar abscess    I certify that, based on my findings, the following durable medical equipment is medically necessary:  Walkers.    HOME O2 Saturation Measurements:(Values must be present for Home Oxygen orders)         ,     ,         My Clinical findings support the need for the above equipment due to:  Abnormal Gait    Supporting Symptoms: n/a    If patient feels more short of breath, they can go up to 6 liters per minute and contact healthcare provider.

## 2021-07-27 LAB
BACTERIA FLD AEROBE CULT: NORMAL
BACTERIA SPEC ANAEROBE CULT: NORMAL
GRAM STN SPEC: NORMAL
SIGNIFICANT IND 70042: NORMAL
SIGNIFICANT IND 70042: NORMAL
SITE SITE: NORMAL
SITE SITE: NORMAL
SOURCE SOURCE: NORMAL
SOURCE SOURCE: NORMAL

## 2021-07-29 ENCOUNTER — PATIENT OUTREACH (OUTPATIENT)
Dept: HEALTH INFORMATION MANAGEMENT | Facility: OTHER | Age: 41
End: 2021-07-29

## 2021-07-29 LAB
BACTERIA SPEC ANAEROBE CULT: NORMAL
SIGNIFICANT IND 70042: NORMAL
SITE SITE: NORMAL
SOURCE SOURCE: NORMAL

## 2021-07-29 SDOH — ECONOMIC STABILITY: INCOME INSECURITY: HOW HARD IS IT FOR YOU TO PAY FOR THE VERY BASICS LIKE FOOD, HOUSING, MEDICAL CARE, AND HEATING?: SOMEWHAT HARD

## 2021-07-29 SDOH — ECONOMIC STABILITY: FOOD INSECURITY: WITHIN THE PAST 12 MONTHS, YOU WORRIED THAT YOUR FOOD WOULD RUN OUT BEFORE YOU GOT MONEY TO BUY MORE.: NEVER TRUE

## 2021-07-29 SDOH — ECONOMIC STABILITY: FOOD INSECURITY: WITHIN THE PAST 12 MONTHS, THE FOOD YOU BOUGHT JUST DIDN'T LAST AND YOU DIDN'T HAVE MONEY TO GET MORE.: NEVER TRUE

## 2021-07-29 SDOH — ECONOMIC STABILITY: TRANSPORTATION INSECURITY
IN THE PAST 12 MONTHS, HAS LACK OF TRANSPORTATION KEPT YOU FROM MEETINGS, WORK, OR FROM GETTING THINGS NEEDED FOR DAILY LIVING?: NO

## 2021-07-29 SDOH — ECONOMIC STABILITY: TRANSPORTATION INSECURITY
IN THE PAST 12 MONTHS, HAS THE LACK OF TRANSPORTATION KEPT YOU FROM MEDICAL APPOINTMENTS OR FROM GETTING MEDICATIONS?: NO

## 2021-07-29 NOTE — PROGRESS NOTES
Community Health Worker Intake    • Social determinates of health intake completed  • Identified barriers to: uninsured.  • Contact information provided to Cristiano Soto   • Outpatient assessment completed.  • Did the patient receive medications post discharge: Yes    CHW Micah reached out to pt via TC to f/u after d/c and introduce CCM services. Pt accepted. He mentioned not yet having a f/u with his PCP but will schedule one today. Declined CHW assistance with scheduling. Pt picked up all of his medications from [facility], no questions or side-effects so far. He is uninsured and was agreeable that CHW mail Insurance Assisters flyer, [facility] flyer & Good Rx card. He did not express any transportation or financial barriers other than paying for healthcare. CHW will mail [employer] Financial Assistance application as well. He lives at home with his wife and children who are all a great support to him. Pt requested basic information on a diabetic diet as well. CHW will mail Knowledge is power: Conquering Type 2 Diabetes booklet and Planning Healthy Meals booklet. No additional resources/services needed at this time.     Plan: d/c due to needs/goals met 7/29.

## 2021-08-25 LAB
FUNGUS SPEC CULT: NORMAL
FUNGUS SPEC CULT: NORMAL
FUNGUS SPEC FUNGUS STN: NORMAL
FUNGUS SPEC FUNGUS STN: NORMAL
SIGNIFICANT IND 70042: NORMAL
SIGNIFICANT IND 70042: NORMAL
SITE SITE: NORMAL
SITE SITE: NORMAL
SOURCE SOURCE: NORMAL
SOURCE SOURCE: NORMAL

## 2023-07-20 ENCOUNTER — HOSPITAL ENCOUNTER (EMERGENCY)
Facility: MEDICAL CENTER | Age: 43
End: 2023-07-20
Attending: EMERGENCY MEDICINE

## 2023-07-20 VITALS
DIASTOLIC BLOOD PRESSURE: 107 MMHG | BODY MASS INDEX: 28.73 KG/M2 | HEIGHT: 69 IN | TEMPERATURE: 96.6 F | WEIGHT: 194 LBS | SYSTOLIC BLOOD PRESSURE: 195 MMHG | OXYGEN SATURATION: 99 % | HEART RATE: 89 BPM | RESPIRATION RATE: 16 BRPM

## 2023-07-20 DIAGNOSIS — G51.0 BELL'S PALSY: ICD-10-CM

## 2023-07-20 PROCEDURE — 99282 EMERGENCY DEPT VISIT SF MDM: CPT

## 2023-07-20 RX ORDER — VALACYCLOVIR HYDROCHLORIDE 1 G/1
1000 TABLET, FILM COATED ORAL 3 TIMES DAILY
Qty: 21 TABLET | Refills: 0 | Status: ACTIVE | OUTPATIENT
Start: 2023-07-20 | End: 2023-07-27

## 2023-07-20 RX ORDER — PREDNISONE 10 MG/1
TABLET ORAL
Qty: 45 TABLET | Refills: 0 | Status: SHIPPED | OUTPATIENT
Start: 2023-07-20 | End: 2023-07-20 | Stop reason: SDUPTHER

## 2023-07-20 RX ORDER — PREDNISONE 10 MG/1
TABLET ORAL
Qty: 45 TABLET | Refills: 0 | Status: SHIPPED | OUTPATIENT
Start: 2023-07-20

## 2023-07-20 RX ORDER — VALACYCLOVIR HYDROCHLORIDE 1 G/1
1000 TABLET, FILM COATED ORAL 3 TIMES DAILY
Qty: 21 TABLET | Refills: 0 | Status: SHIPPED | OUTPATIENT
Start: 2023-07-20 | End: 2023-07-20 | Stop reason: SDUPTHER

## 2023-07-20 ASSESSMENT — FIBROSIS 4 INDEX: FIB4 SCORE: 0.69

## 2023-07-20 NOTE — ED TRIAGE NOTES
"PT presents with left sided facial numbness and tingling, including facial droop and difficulty closing eye (all left side).  This started, \"yesterday at about 10am,\" per patient.  Patient denies dizziness or weakness.  [place] stroke test performed in triage; results showed left sided facial droop.    "

## 2023-07-20 NOTE — DISCHARGE INSTRUCTIONS
Tape your eye closed at night.  Follow-up with a primary doctor.  Return for new or concerning symptoms.  Your blood pressure was high today-please monitor at home and follow-up with your primary doctor this week for recheck.

## 2023-07-20 NOTE — ED NOTES
Pt and family given d/c paperwork and RX p/u information; pt and family verbalized understanding all information given. Pt ambulated out of the ER w/o difficulty w/ family

## 2023-07-20 NOTE — ED PROVIDER NOTES
ER Provider Note    Scribed for Rui Farrell M.D. by Clarissa Fernandez. 7/20/2023 9:13 AM    Primary Care Provider: Mohsen Tamasaby, M.D.    CHIEF COMPLAINT  Chief Complaint   Patient presents with    Facial Droop     Yesterday pt was taking a shower and when he went to spit the water out of his mouth it went to the left and that is when he noticed he was having some facial droop.   Throughout the evening he noticed some issues with his mouth.   This morning he had one episode of emesis.      EXTERNAL RECORDS REVIEWED  Review of patient's past medical records show a history of diabetes and hypertension.     HPI/ROS  OUTSIDE HISTORIAN(S):  None.    LIMITATION TO HISTORY   Language (Kinyarwanda),  Used     Cristiano J Yomi Soto is a 42 y.o. male with a history of diabetes who presents to the Emergency Department for right sided facial numbness onset yesterday approximately 9:30 AM. The patient noted that he was taking a shower and got some water in his mouth. When he tried to spit it out it went out sideways to the left. This happened two times. The patient noticed that his mouth was still having issues 4-5 hours later. He had one episode of emesis this morning but notes that it was only water. The patient adds that he has recently been under a lot of stress at work and thinks that this may be related to his symptoms. The patient denies any headache, shortness of breath, chest pain, or fevers.     PAST MEDICAL HISTORY  Past Medical History:   Diagnosis Date    DM (diabetes mellitus) (HCC)     Essential hypertension 7/25/2021    Other general symptoms(780.99)     hemorrhoids    Septic bursitis        SURGICAL HISTORY  Past Surgical History:   Procedure Laterality Date    INCISION AND DRAINAGE ORTHOPEDIC Left 7/23/2021    Procedure: INCISION AND DRAINAGE, WOUND, BY ORTHOPEDICS, KNEE;  Surgeon: Ahsan Lara M.D.;  Location: SURGERY [facility];  Service: Orthopedics       FAMILY HISTORY  No pertinent  Referred by: Mikel Espinosa MD; Medical Diagnosis (from order):    Diagnosis Information      Diagnosis    V45.89 (ICD-9-CM) - Z98.890 (ICD-10-CM) - S/P arthroscopy of shoulder    719.41, 338.29 (ICD-9-CM) - M25.511, G89.29 (ICD-10-CM) - Chronic right shoulder pain    726.2 (ICD-9-CM) - M75.41 (ICD-10-CM) - Impingement syndrome of right shoulder                Physical Therapy -  Daily Treatment Note    Visit:  16     SUBJECTIVE                                                                                                             Pt not feeling well as she forgot to take all of her meds today. She feels panicky. Pt was sore but felt looser after therapy last session.     Pain / Symptoms:  Pain rating (out of 10): Current: 5     OBJECTIVE                                                                                                                          TREATMENT                                                                                                                  Therapeutic Exercise:  UBE x 5 min level 1  Pulleys flexion and abduction 3  x 10 reps each, unable to perform scaption because of pain  scap retraction 2 x 10 reps green tband  Shoulder extension 2 x 10 green tband    IR x 20 reps right shoulder orange tband  ER x 20 reps right shoulder orange tband  Reactive isometrics IR and ER with orange tband x 5 reps each  Bicep stretch to the // bars 2 x 60 sec  Bicep 3# 3 x 10 3 different forearm positions  Triceps push down 10# 2 x 10 reps  Lat pull downs 2 x 10 reps 30#  Chest press 10# 1 x 10 reps  Soreness in shoulder so adjusted arm position several times  Codmans circles  Dooway pec stretch bilateral 2 x 30 sec   Sidelying shoulder ER 1 x 10 with 2#  Sidelying shoulder flexion 1x 5 reps AROM 1#    Cupping to right deltoid x 4 min    Skilled input: verbal instruction/cues, tactile instruction/cues and posture correction    Writer verbally educated and received verbal consent for hand  "family history.    SOCIAL HISTORY   reports that he has quit smoking. He has never used smokeless tobacco. He reports that he does not currently use alcohol. He reports that he does not use drugs.    CURRENT MEDICATIONS  Previous Medications    ACETAMINOPHEN (TYLENOL) 325 MG TAB    Take 650 mg by mouth every four hours as needed for Moderate Pain.    IBUPROFEN (MOTRIN) 600 MG TAB    Take 600 mg by mouth every 6 hours. Pt started on 7/19/2021    LISINOPRIL (PRINIVIL) 10 MG TAB    Take 1 tablet by mouth every day.    METFORMIN (GLUCOPHAGE) 1000 MG TABLET    Take 1,000 mg by mouth every day.    METFORMIN (GLUCOPHAGE) 500 MG TAB    Take 1 tablet by mouth at bedtime.       ALLERGIES  Nkda [no known drug allergy]    PHYSICAL EXAM  BP (!) 180/106   Pulse 88   Temp (!) 35.8 °C (96.4 °F) (Temporal)   Resp 16   Ht 1.753 m (5' 9\")   Wt 88 kg (194 lb 0.1 oz)   SpO2 98%   BMI 28.65 kg/m²     Constitutional: Well developed, Well nourished, No acute distress, Non-toxic appearance.   HENT: Normocephalic, Atraumatic, mucous membranes moist, no erythema, exudates, swelling, or masses, nares patent.  Eyes: Nonicteric.  Neck: Supple, no meningismus.  Lymphatic: No lymphadenopathy noted.   Cardiovascular: Regular rate and rhythm, no gallops rubs or murmurs.  Lungs: Clear bilaterally.  Abdomen: Soft and nontender throughout.  Skin: Warm, Dry, no rash.  Genitalia: Deferred.  Rectal: Deferred.  Extremities: No edema.  Neurologic: Alert, appropriate, follows commands, moving all extremities, Left facial droop that involves the forehead, sensation intact, No pronator drift, No neglect, Finger to nose intact, Visual field intact, NIH Stroke Score of 1, Speech slightly limited due to facial paralysis but otherwise intact.  Psychiatric: Affect normal.    COURSE & MEDICAL DECISION MAKING    ED Observation Status? No; Patient does not meet criteria for ED Observation.     INITIAL ASSESSMENT AND PLAN  Care Narrative: This is a 42-year-old " placement, positioning of patient, and techniques to be performed today from patient for hand placement and palpation for techniques and therapist position for techniques as described above and how they are pertinent to the patient's plan of care.    Home Exercise Program: (*above indicates provided as part of home exercise program)  Doing pendulums, table slides, AROM IR/ER and elbow flex/ext   Access Code: [email or web address]   URL: [email or web address]   Date: 01/24/2020   Prepared by: Liliya Sanchez    Exercises Doorway Pec Stretch at 90 Degrees Abduction - 1 reps - 3 sets - 30 sec hold - 1x daily - 7x weekly   Standing Bicep Stretch at Wall - 1 reps - 3 sets - 30 sec hold - 1x daily - 7x weekly   Sidelying Shoulder External Rotation - 10 reps - 3 sets - 1x daily - 7x weekly   Sidelying Shoulder Flexion 15 Degrees - 5 reps - 3 sets - 1x daily - 7x weekly   Prone Scapular Retraction - 10 reps - 3 sets - 1x daily - 7x weekly         ASSESSMENT                                                                                                                 Pt with increased tolerance to weights.  Some lateral neck pain limiting patient with AROM this date.    Pain/symptoms after session: 3  Patient Education:   Results of above outlined education: Verbalizes understanding and Demonstrates understanding       Procedures and total treatment time documented Time Entry flowsheet.     male who presents about 24 hours out from the first time he recognized symptoms of facial paralysis on the left side.  Initially it was reported that he had numbness in the face on the left as well as weakness out front in triage.  This was prior to utilizing an .  The patient does not appear to have any numbness-he only has motor deficits on the left side which do involve the forehead.  This is highly suggestive of Bell's palsy.  He has no other focal deficits on exam to suggest a stroke has occurred.  Notably he is hypertensive and has a history of hypertension.  The patient does not have any aphasia, focal weakness outside of his face, no hemineglect no visual field cuts or any other focal neurologic deficits outside of motor dysfunction in the left forehead and face.  The patient will be treated for Bell's palsy with steroids valacyclovir and Lacri-Lube.  He will follow-up with his regular doctor for blood pressure management.  He does not appear to have evidence of acute stroke and is certainly not a tPA candidate or interventional candidate based on the presentation as well as time of onset.    9:13 AM - Patient seen and examined at bedside. I informed the patient that his symptoms are likely due to Bell's Palsy. I educated the patient on the etiology and precautions of Bell's Palsy. Patient had the opportunity to ask any questions. The plan for discharge was discussed with them and he was told to return for any new or worsening symptoms. He was also informed of the plans for follow up. Patient is understanding and agreeable to the plan for discharge.               DISPOSITION AND DISCUSSIONS  I have discussed management of the patient with the following physicians and SWATI's: None.    Discussion of management with other QHP or appropriate source(s): None     Decision tools and prescription drugs considered including, but not limited to: NIH Stroke Scale 1.    The patient will return for new or  worsening symptoms and is stable at the time of discharge.    The patient is referred to a primary physician for blood pressure management, diabetic screening, and for all other preventative health concerns.    DISPOSITION:  Patient will be discharged home in stable condition.    FOLLOW UP:  Mohsen Tamasaby, M.D.  [address] [place] [address]  [place] [address]  [phone]    Schedule an appointment as soon as possible for a visit today        OUTPATIENT MEDICATIONS:  New Prescriptions    ARTIFICIAL TEARS (EYE LUBRICANT) OINTMENT OPHTH OINTMENT    Apply 1 Application to both eyes every 8 hours.    PREDNISONE (DELTASONE) 10 MG TAB    Take 6 tabs (60 mg) po daily on days 1-5, then take 5 tabs day 6, then 4 tabs day 7, then 3 tabs daily 8, then 2 tabs day 9, then 1 tablet day 10    VALACYCLOVIR (VALTREX) 1 GM TAB    Take 1 Tablet by mouth 3 times a day for 7 days.        FINAL IMPRESSION   1. Bell's palsy        Clarissa DEL ANGEL (Chuy), am scribing for, and in the presence of, Rui Farrell M.D..    Electronically signed by: Clarissa Fernandez (Baibe), 7/20/2023    Rui DEL ANGEL M.D. personally performed the services described in this documentation, as scribed by Clarissa Fernandez in my presence, and it is both accurate and complete.     The note accurately reflects work and decisions made by me.  Rui Farrell M.D.  7/20/2023  9:40 AM

## (undated) DEVICE — LACTATED RINGERS INJ 1000 ML - (14EA/CA 60CA/PF)

## (undated) DEVICE — DRAPE U ORTHOPEDIC - (10/BX)

## (undated) DEVICE — SWAB ANAEROBIC SPEC.COLLECTOR - (25/PK 4PK/CA 100EA/CA)

## (undated) DEVICE — NEPTUNE 4 PORT MANIFOLD - (20/PK)

## (undated) DEVICE — SUTURE 3-0 ETHILON FS-1 - (36/BX) 30 INCH

## (undated) DEVICE — BLADE SURGICAL #15 - (50/BX 3BX/CA)

## (undated) DEVICE — PACK LOWER EXTREMITY - (2/CA)

## (undated) DEVICE — GLOVE BIOGEL SZ 8 SURGICAL PF LTX - (50PR/BX 4BX/CA)

## (undated) DEVICE — DRAPE LARGE 3 QUARTER - (20/CA)

## (undated) DEVICE — TOWEL STOP TIMEOUT SAFETY FLAG (40EA/CA)

## (undated) DEVICE — WRAP COBAN SELF-ADHERENT 6 IN X  5YDS STERILE TAN (12/CA)

## (undated) DEVICE — GLOVE, LITE (PAIR)

## (undated) DEVICE — SODIUM CHL IRRIGATION 0.9% 1000ML (12EA/CA)

## (undated) DEVICE — STOCKINETTE IMPERVIOUS 12X48 - STERILELF (10/CA)"

## (undated) DEVICE — TIP INTPLS HFLO ML ORFC BTRY - (12/CS)  FOR SURGILAV

## (undated) DEVICE — SUTURE GENERAL

## (undated) DEVICE — PAD PREP 24 X 48 CUFFED - (100/CA)

## (undated) DEVICE — TUBE, CULTURE AEROBIC

## (undated) DEVICE — ELECTRODE DUAL RETURN W/ CORD - (50/PK)